# Patient Record
Sex: FEMALE | Race: OTHER | Employment: OTHER | URBAN - METROPOLITAN AREA
[De-identification: names, ages, dates, MRNs, and addresses within clinical notes are randomized per-mention and may not be internally consistent; named-entity substitution may affect disease eponyms.]

---

## 2021-11-04 ENCOUNTER — HOSPITAL ENCOUNTER (OUTPATIENT)
Dept: PREADMISSION TESTING | Age: 86
Discharge: HOME | End: 2021-11-04

## 2021-11-04 VITALS — HEIGHT: 66 IN | BODY MASS INDEX: 17.68 KG/M2 | WEIGHT: 110 LBS

## 2021-11-04 RX ORDER — ASPIRIN 81 MG/1
81 TABLET ORAL DAILY
COMMUNITY

## 2021-11-04 RX ORDER — PAROXETINE 10 MG/1
10 TABLET, FILM COATED ORAL DAILY
COMMUNITY

## 2021-11-04 RX ORDER — SODIUM CHLORIDE, SODIUM LACTATE, POTASSIUM CHLORIDE, CALCIUM CHLORIDE 600; 310; 30; 20 MG/100ML; MG/100ML; MG/100ML; MG/100ML
75 INJECTION, SOLUTION INTRAVENOUS CONTINUOUS
Status: CANCELLED | OUTPATIENT
Start: 2021-11-04

## 2021-11-04 RX ORDER — TROPICAMIDE 10 MG/ML
1 SOLUTION/ DROPS OPHTHALMIC
Status: CANCELLED | OUTPATIENT
Start: 2021-11-04 | End: 2021-11-04

## 2021-11-04 RX ORDER — BISMUTH SUBSALICYLATE 262 MG
1 TABLET,CHEWABLE ORAL DAILY
COMMUNITY
End: 2021-11-04

## 2021-11-04 RX ORDER — FAMOTIDINE 20 MG/1
20 TABLET, FILM COATED ORAL DAILY
COMMUNITY

## 2021-11-04 RX ORDER — PHENYLEPHRINE HYDROCHLORIDE 25 MG/ML
1 SOLUTION/ DROPS OPHTHALMIC
Status: CANCELLED | OUTPATIENT
Start: 2021-11-04 | End: 2021-11-04

## 2021-11-04 NOTE — PERIOP NOTES
PAT - SURGICAL PRE-ADMISSION INSTRUCTIONS    NAME:  Meghan Vieyra                                                          TODAY'S DATE:  11/4/2021    SURGERY DATE:  11/17/2021                                  SURGERY ARRIVAL TIME:   TBA    1. Do NOT eat or drink anything, including candy or gum, after MIDNIGHT on 11-17 , unless you have specific instructions from your Surgeon or Anesthesia Provider to do so. 2. No smoking 24 hours before surgery. 3. No alcohol 24 hours prior to the day of surgery. 4. No recreational drugs for one week prior to the day of surgery. 5. Leave all valuables, including money/purse, at home. 6. Remove all jewelry, nail polish, makeup (including mascara); no lotions, powders, deodorant, or perfume/cologne/after shave. 7. Glasses/Contact lenses and Dentures may be worn to the hospital.  They will be removed prior to surgery. 8. Call your doctor if symptoms of a cold or illness develop within 24 ours prior to surgery. 9. AN ADULT MUST DRIVE YOU HOME AFTER OUTPATIENT SURGERY. 10. If you are having an OUTPATIENT procedure, please make arrangements for a responsible adult to be with you for 24 hours after your surgery. 11. If you are admitted to the hospital, you will be assigned to a bed after surgery is complete. Normally a family member will not be able to see you until you are in your assigned bed. 12. Visitation Restrictions Explained. Special Instructions:  Covid Test 11-12, Quarantine requirements discussed  NONE. Patient Prep:    N/A          These surgical instructions were reviewed with the patient during the PAT phone call     Pt.s  is POA.  He will assist her on [facility]

## 2021-11-12 ENCOUNTER — HOSPITAL ENCOUNTER (OUTPATIENT)
Dept: PREADMISSION TESTING | Age: 86
Discharge: HOME | End: 2021-11-12
Payer: MEDICARE

## 2021-11-12 PROCEDURE — U0003 INFECTIOUS AGENT DETECTION BY NUCLEIC ACID (DNA OR RNA); SEVERE ACUTE RESPIRATORY SYNDROME CORONAVIRUS 2 (SARS-COV-2) (CORONAVIRUS DISEASE [COVID-19]), AMPLIFIED PROBE TECHNIQUE, MAKING USE OF HIGH THROUGHPUT TECHNOLOGIES AS DESCRIBED BY CMS-2020-01-R: HCPCS

## 2021-11-13 LAB — SARS-COV-2, COV2NT: NOT DETECTED

## 2021-11-16 ENCOUNTER — ANESTHESIA EVENT (OUTPATIENT)
Dept: SURGERY | Age: 86
End: 2021-11-16
Payer: MEDICARE

## 2021-11-16 RX ORDER — DEXTROSE 50 % IN WATER (D50W) INTRAVENOUS SYRINGE
25-50 AS NEEDED
Status: CANCELLED | OUTPATIENT
Start: 2021-11-16

## 2021-11-16 RX ORDER — SODIUM CHLORIDE, SODIUM LACTATE, POTASSIUM CHLORIDE, CALCIUM CHLORIDE 600; 310; 30; 20 MG/100ML; MG/100ML; MG/100ML; MG/100ML
1000 INJECTION, SOLUTION INTRAVENOUS CONTINUOUS
Status: CANCELLED | OUTPATIENT
Start: 2021-11-16

## 2021-11-16 RX ORDER — SODIUM CHLORIDE 0.9 % (FLUSH) 0.9 %
5-40 SYRINGE (ML) INJECTION EVERY 8 HOURS
Status: CANCELLED | OUTPATIENT
Start: 2021-11-16

## 2021-11-16 RX ORDER — MAGNESIUM SULFATE 100 %
4 CRYSTALS MISCELLANEOUS AS NEEDED
Status: CANCELLED | OUTPATIENT
Start: 2021-11-16

## 2021-11-16 RX ORDER — FLUMAZENIL 0.1 MG/ML
0.2 INJECTION INTRAVENOUS
Status: CANCELLED | OUTPATIENT
Start: 2021-11-16

## 2021-11-16 RX ORDER — NALOXONE HYDROCHLORIDE 0.4 MG/ML
0.1 INJECTION, SOLUTION INTRAMUSCULAR; INTRAVENOUS; SUBCUTANEOUS AS NEEDED
Status: CANCELLED | OUTPATIENT
Start: 2021-11-16

## 2021-11-16 RX ORDER — SODIUM CHLORIDE 0.9 % (FLUSH) 0.9 %
5-40 SYRINGE (ML) INJECTION AS NEEDED
Status: CANCELLED | OUTPATIENT
Start: 2021-11-16

## 2021-11-17 ENCOUNTER — ANESTHESIA (OUTPATIENT)
Dept: SURGERY | Age: 86
End: 2021-11-17
Payer: MEDICARE

## 2021-11-17 ENCOUNTER — HOSPITAL ENCOUNTER (OUTPATIENT)
Age: 86
Setting detail: OUTPATIENT SURGERY
Discharge: HOME | End: 2021-11-17
Attending: OPHTHALMOLOGY | Admitting: OPHTHALMOLOGY
Payer: MEDICARE

## 2021-11-17 VITALS
OXYGEN SATURATION: 100 % | TEMPERATURE: 97.7 F | BODY MASS INDEX: 16.07 KG/M2 | SYSTOLIC BLOOD PRESSURE: 122 MMHG | HEART RATE: 92 BPM | WEIGHT: 100 LBS | HEIGHT: 66 IN | RESPIRATION RATE: 16 BRPM | DIASTOLIC BLOOD PRESSURE: 59 MMHG

## 2021-11-17 PROBLEM — H26.9 CATARACT: Status: ACTIVE | Noted: 2021-11-17

## 2021-11-17 PROCEDURE — 74011250636 HC RX REV CODE- 250/636: Performed by: OPHTHALMOLOGY

## 2021-11-17 PROCEDURE — 74011250636 HC RX REV CODE- 250/636: Performed by: ANESTHESIOLOGY

## 2021-11-17 PROCEDURE — 2709999900 HC NON-CHARGEABLE SUPPLY: Performed by: OPHTHALMOLOGY

## 2021-11-17 PROCEDURE — 77030013851 HC PK PHACO KT ALCN -B: Performed by: OPHTHALMOLOGY

## 2021-11-17 PROCEDURE — 77030002949 HC SUT NYL ALCN -B: Performed by: OPHTHALMOLOGY

## 2021-11-17 PROCEDURE — 77030040361 HC SLV COMPR DVT MDII -B: Performed by: OPHTHALMOLOGY

## 2021-11-17 PROCEDURE — 77030006685 HC BLD OPHTH ALCN -B: Performed by: OPHTHALMOLOGY

## 2021-11-17 PROCEDURE — 77030018837 HC SOL IRR OPTH ALCN -A: Performed by: OPHTHALMOLOGY

## 2021-11-17 PROCEDURE — 76210000021 HC REC RM PH II 0.5 TO 1 HR: Performed by: OPHTHALMOLOGY

## 2021-11-17 PROCEDURE — 77030020272 HC MISC IMPL LENS: Performed by: OPHTHALMOLOGY

## 2021-11-17 PROCEDURE — 74011000250 HC RX REV CODE- 250: Performed by: OPHTHALMOLOGY

## 2021-11-17 PROCEDURE — 76060000033 HC ANESTHESIA 1 TO 1.5 HR: Performed by: OPHTHALMOLOGY

## 2021-11-17 PROCEDURE — 76010000149 HC OR TIME 1 TO 1.5 HR: Performed by: OPHTHALMOLOGY

## 2021-11-17 PROCEDURE — 77030020782 HC GWN BAIR PAWS FLX 3M -B: Performed by: OPHTHALMOLOGY

## 2021-11-17 PROCEDURE — V2632 POST CHMBR INTRAOCULAR LENS: HCPCS | Performed by: OPHTHALMOLOGY

## 2021-11-17 PROCEDURE — 77030013389: Performed by: OPHTHALMOLOGY

## 2021-11-17 PROCEDURE — 77030031761 HC CYSTOTOM OPHTH IRR SYS BVTC -A: Performed by: OPHTHALMOLOGY

## 2021-11-17 PROCEDURE — 77030034627 HC PRB VITRCMY ANT CENTUR ALCN -G1: Performed by: OPHTHALMOLOGY

## 2021-11-17 DEVICE — LENS INTRAOCULAR BCNVX 6X13 MM ANTR CHMBR MLPC CYL PWR SIL: Type: IMPLANTABLE DEVICE | Site: EYE | Status: FUNCTIONAL

## 2021-11-17 RX ORDER — LIDOCAINE HYDROCHLORIDE 10 MG/ML
INJECTION, SOLUTION EPIDURAL; INFILTRATION; INTRACAUDAL; PERINEURAL AS NEEDED
Status: DISCONTINUED | OUTPATIENT
Start: 2021-11-17 | End: 2021-11-17 | Stop reason: HOSPADM

## 2021-11-17 RX ORDER — EPINEPHRINE 1 MG/ML
INJECTION, SOLUTION, CONCENTRATE INTRAVENOUS AS NEEDED
Status: DISCONTINUED | OUTPATIENT
Start: 2021-11-17 | End: 2021-11-17 | Stop reason: HOSPADM

## 2021-11-17 RX ORDER — TROPICAMIDE 10 MG/ML
1 SOLUTION/ DROPS OPHTHALMIC
Status: COMPLETED | OUTPATIENT
Start: 2021-11-17 | End: 2021-11-17

## 2021-11-17 RX ORDER — PHENYLEPHRINE HYDROCHLORIDE 25 MG/ML
1 SOLUTION/ DROPS OPHTHALMIC
Status: COMPLETED | OUTPATIENT
Start: 2021-11-17 | End: 2021-11-17

## 2021-11-17 RX ORDER — SODIUM CHLORIDE, SODIUM LACTATE, POTASSIUM CHLORIDE, CALCIUM CHLORIDE 600; 310; 30; 20 MG/100ML; MG/100ML; MG/100ML; MG/100ML
75 INJECTION, SOLUTION INTRAVENOUS CONTINUOUS
Status: DISCONTINUED | OUTPATIENT
Start: 2021-11-17 | End: 2021-11-17 | Stop reason: HOSPADM

## 2021-11-17 RX ORDER — FENTANYL CITRATE 50 UG/ML
INJECTION, SOLUTION INTRAMUSCULAR; INTRAVENOUS AS NEEDED
Status: DISCONTINUED | OUTPATIENT
Start: 2021-11-17 | End: 2021-11-17 | Stop reason: HOSPADM

## 2021-11-17 RX ADMIN — SODIUM CHLORIDE, POTASSIUM CHLORIDE, SODIUM LACTATE AND CALCIUM CHLORIDE 75 ML/HR: 600; 310; 30; 20 INJECTION, SOLUTION INTRAVENOUS at 11:05

## 2021-11-17 RX ADMIN — LIDOCAINE HYDROCHLORIDE 2 DROP: 35 GEL OPHTHALMIC at 11:18

## 2021-11-17 RX ADMIN — PHENYLEPHRINE HYDROCHLORIDE 1 DROP: 2.5 SOLUTION/ DROPS OPHTHALMIC at 11:06

## 2021-11-17 RX ADMIN — FENTANYL CITRATE 25 MCG: 50 INJECTION, SOLUTION INTRAMUSCULAR; INTRAVENOUS at 12:29

## 2021-11-17 RX ADMIN — TROPICAMIDE 1 DROP: 10 SOLUTION/ DROPS OPHTHALMIC at 11:00

## 2021-11-17 RX ADMIN — PHENYLEPHRINE HYDROCHLORIDE 1 DROP: 2.5 SOLUTION/ DROPS OPHTHALMIC at 11:10

## 2021-11-17 RX ADMIN — TROPICAMIDE 1 DROP: 10 SOLUTION/ DROPS OPHTHALMIC at 11:10

## 2021-11-17 RX ADMIN — PHENYLEPHRINE HYDROCHLORIDE 1 DROP: 2.5 SOLUTION/ DROPS OPHTHALMIC at 11:00

## 2021-11-17 RX ADMIN — TROPICAMIDE 1 DROP: 10 SOLUTION/ DROPS OPHTHALMIC at 11:06

## 2021-11-17 RX ADMIN — PHENYLEPHRINE HYDROCHLORIDE 1 DROP: 2.5 SOLUTION/ DROPS OPHTHALMIC at 11:15

## 2021-11-17 RX ADMIN — FENTANYL CITRATE 25 MCG: 50 INJECTION, SOLUTION INTRAMUSCULAR; INTRAVENOUS at 12:20

## 2021-11-17 RX ADMIN — TROPICAMIDE 1 DROP: 10 SOLUTION/ DROPS OPHTHALMIC at 11:15

## 2021-11-17 RX ADMIN — FENTANYL CITRATE 25 MCG: 50 INJECTION, SOLUTION INTRAMUSCULAR; INTRAVENOUS at 12:24

## 2021-11-17 NOTE — INTERVAL H&P NOTE
Update History & Physical    The Patient's History and Physical of November 17, 2021 was reviewed with the patient and I examined the patient. There was no change. The surgical site was confirmed by the patient and me. Plan:  The risk, benefits, expected outcome, and alternative to the recommended procedure have been discussed with the patient. Patient understands and wants to proceed with the procedure.     Electronically signed by Collette Rowe MD on 11/17/2021 at 10:27 AM

## 2021-11-17 NOTE — DISCHARGE INSTRUCTIONS
Post-Operative Cataract Instructions  [facility] INVASIVE SURGERY [facility]  Wisam Ramirez M.D. Lamonte Alcaraz. [address]se, [facility]  [phone]      Diet  1. Resume normal diet. 2. Do not drink alcoholic beverages, including beer for 24 hours. Activity  1. Do not drive a car or operate any hazardous machinery the day of surgery. 2. You may resume normal activities as tolerated. 3. No bending or heavy lifting. 4. No reading or computer work after your surgery. You may watch TV. Wound Care  1. Anticipate that your eye will tear and water. 2. You may also experience a sensation of a foreign body, sand, or grit in the surgical eye, this is normal.  3. Do not touch the affected eye. 4. ** Do not remove eye shield unless directed to do so by your physician. **  5. Wear eye shield when resting or sleeping for one week. Medications  1. Take Tylenol Extra Strength two (2) tablets by mouth upon arrival home and then every four (4) hours as needed for discomfort. 2. Regarding Eye drops:  -  Begin using your eye drops as directed by your physician in the (left) operative eye. One drop of     []   Zymar    [x]  Vigamox   along with one (1) drop     []  Acular    [x]  Voltaren   every four (4) hours while awake. Wait five (5) minutes then apply one (1) drop     []  Pred Forte    [x]  Econopred Plus   every four (4) hours while awake. 3. If you take glaucoma medications, continue to do so unless the physician otherwise instructs you. 4. You may use artificial tears as needed if your eye feels scratchy. Notify your Physician Immediately for any of the followin. Excessive pain not relieved by Tylenol especially headache or increasing pressure to the operative eye. 2. Persistent nausea lasting more than eight hours. 3. If any vomiting occurs. If any questions or concerns arise, call your Surgeon at [phone].     Keep scheduled appointment with Dr. Maurizio Steinberg for tomorrow - Thursday, 11/18/21    Continue Using Eye Drops as instructed per Dr. Sohail Dowell office    Use eye shield at all times     DISCHARGE SUMMARY from Nurse    PATIENT INSTRUCTIONS:    After general anesthesia or intravenous sedation, for 24 hours or while taking prescription Narcotics:  · Limit your activities  · Do not drive and operate hazardous machinery  · Do not make important personal or business decisions  · Do  not drink alcoholic beverages  · If you have not urinated within 8 hours after discharge, please contact your surgeon on call. Report the following to your surgeon:  · Excessive pain, swelling, redness or odor of or around the surgical area  · Temperature over 100.5  · Nausea and vomiting lasting longer than 4 hours or if unable to take medications  · Any signs of decreased circulation or nerve impairment to extremity: change in color, persistent  numbness, tingling, coldness or increase pain  · Any questions    What to do at Home:  Recommended activity: Ambulate in house    If you experience any of the following symptoms above, please follow up with Dr. Maurizio Steinberg. *  Please give a list of your current medications to your Primary Care Provider. *  Please update this list whenever your medications are discontinued, doses are      changed, or new medications (including over-the-counter products) are added. *  Please carry medication information at all times in case of emergency situations. These are general instructions for a healthy lifestyle:    No smoking/ No tobacco products/ Avoid exposure to second hand smoke  Surgeon General's Warning:  Quitting smoking now greatly reduces serious risk to your health.     Obesity, smoking, and sedentary lifestyle greatly increases your risk for illness    A healthy diet, regular physical exercise & weight monitoring are important for maintaining a healthy lifestyle    You may be retaining fluid if you have a history of heart failure or if you experience any of the following symptoms:  Weight gain of 3 pounds or more overnight or 5 pounds in a week, increased swelling in our hands or feet or shortness of breath while lying flat in bed. Please call your doctor as soon as you notice any of these symptoms; do not wait until your next office visit. Patient armband removed and shredded    The discharge information has been reviewed with the patient and caregiver. The patient and caregiver verbalized understanding. Discharge medications reviewed with the patient and caregiver and appropriate educational materials and side effects teaching were provided. Learning About COVID-19 and Social Distancing  What is it? Social distancing means putting space between yourself and other people. The recommended distance is 6 feet, or about 2 meters. This also means staying away from any place where people may gather, such as perez or other public gathering places. Why is it important? Social distancing is the best way to reduce the spread of COVID-19. This virus seems to spread from person to person through droplets from coughing and sneezing. So if you keep your distance from others, you're less likely to get it or spread it. And social distancing is important for everyone, not just those who are at high risk of infection, like older people. You might have the virus but not have symptoms. You could then give the infection to someone you come into contact with. How is it done? Putting 6 feet, or about 2 meters, between you and other people is the recommended distance. Also stay away from any place where people may gather, such as pe[place] or other public gathering places. So if possible:  · Work from home, and keep your kids at home. · Don't travel if you don't have to. And avoid public transportation, ride-shares, and taxis unless you have no choice.   · Limit shopping to essentials, like food and medicines. · Wear a cloth face cover if you have to go to a public place like the grocery store or pharmacy. · Don't eat in restaurants. (You can still get takeout or food deliveries.)  · Avoid crowds and busy places. Follow stay-at-home orders or other directions for your area. Where can you learn more? Go to http://www.gray.com/  Enter A133 in the search box to learn more about \"Learning About COVID-19 and Social Distancing. \"  Current as of: December 18, 2020               Content Version: 12.8  © 2378-4632 [employer]. Care instructions adapted under license by [employer] (which disclaims liability or warranty for this information). If you have questions about a medical condition or this instruction, always ask your healthcare professional. [address] any warranty or liability for your use of this information.     ___________________________________________________________________________________________________________________________________

## 2021-11-17 NOTE — PERIOP NOTES
Arrived to Phase 2 - no c/o offered - Dr. Harrison Gallardo at bedside - discussing procedure and follow up with pts

## 2021-11-17 NOTE — PERIOP NOTES
Reviewed PTA medication list with patient/caregiver and patient/caregiver denies any additional medications. Patient admits to having a responsible adult care for them at home for at least 24 hours after surgery. Patient encouraged to use gown warming system and informed that using said warming gown to regulate body temperature prior to a procedure has been shown to help reduce the risks of blood clots and infection. Patient's pharmacy of choice verified and documented in PTA medication section. Dual skin assessment & fall risk band verification completed with Ricki Blandon RN.

## 2021-11-17 NOTE — OP NOTES
Cataract Operative Note      Patient: Michelle Stone               Sex: female          DOA: 11/17/2021         YOB: 1927      Age:  80 y.o.        LOS:  LOS: 0 days     Preoperative Diagnosis: Cataract left eye    Postoperative Diagnosis:  Cataract  left eye  Surgeon: Flory Dominguez MD, M.D. Anesthesia:  Topical anesthesia  Procedure:  Phacoemulsification of posterior chamber for intraocular lens implantation left    Fluids:  0    Procedure in Detail: The operative eye was prepped and draped in the usual fashion. A lid speculum was placed in the operative eye. A clear cornea approach was utilized. A paracentesis incision(s) was constructed with a 1 mm slit knife. One percent preservative-free lidocaine followed by viscoelastic was instilled into the anterior chamber. A clear corneal incision was made with a slit knife. A continuous curvilinear capsulorrhexis was constructed followed by hydrodissection. A phacoemulsification tip was placed into the eye, and the lens nucleus was emulsified. [place] through lens removal a PC tear was noted and approximately 1/2 the lens fell posteriorly. An anterior vitrectomy was performed and cortical cleanup was completed. The intraocular lens was then placed into the sulcus after it was re-inflated with viscoelastic. The remaining viscoelastic was then removed using the irrigation/aspiration device. Miochol was instilled in the anterior chamber. One 10-nylon suture was placed. At the end of the procedure the wound was found to be watertight, the anterior chamber was deep and the pupil round. No blood loss during surgery. An antibiotic and anti-inflammatroy was placed into the operative eye. The lid speculum was removed. Protective sunglasses were then placed onto the patient. The patient was taken to the [address] [place] [address] Unit (PACU) in good condition having tolerated the procedure well.     Estimated Blood Loss: 0 Implants:   Implant Name Type Inv.  Item Serial No.  Lot No. LRB No. Used Action   LENS INTRAOCULAR BCNVX 6X13 MM ANTR CHMBR MLPC CYL [facility] - [id number]  LENS INTRAOCULAR BCNVX 6X13 MM ANTR CHMBR MLPC CYL [facility] [id number]  Left 1 Implanted       Specimens: * No specimens in log *            Complications:  As above           Lei Alba MD , MDAVID.  [unfilled]  1:18 PM

## 2021-11-17 NOTE — PERIOP NOTES
Pt was accompanied by her , Gene, and a caregiver.  has POA and signed surgical consent at office. He is with pt in holding and has been good historian for patient information.

## 2021-11-17 NOTE — ANESTHESIA POSTPROCEDURE EVALUATION
Procedure(s):  CATARACT EXTRACTION WITH INTRA OCULAR LENS IMPLANT- LEFT EYE. MAC    Anesthesia Post Evaluation        Comments: Post-Anesthesia Evaluation and Assessment    Cardiovascular Function/Vital Signs  /66   Pulse 98   Temp 36.8 °C (98.3 °F)   Resp 16   Ht 5' 5.5\" (1.664 m)   Wt 45.4 kg (100 lb)   SpO2 96%   BMI 16.39 kg/m²     Patient is status post Procedure(s):  CATARACT EXTRACTION WITH INTRA OCULAR LENS IMPLANT- LEFT EYE. Nausea/Vomiting: Controlled. Postoperative hydration reviewed and adequate. Pain:  Pain Scale 1: FLACC (11/17/21 1325)  Pain Intensity 1: 0 (11/17/21 1325)   Managed. Neurological Status:   Neuro (WDL): Within Defined Limits (11/17/21 1107)   At baseline. Mental Status and Level of Consciousness: Arousable. Pulmonary Status:   O2 Device: None (Room air) (11/17/21 1325)   Adequate oxygenation and airway patent. Complications related to anesthesia: None    Post-anesthesia assessment completed. No concerns. Patient has met all discharge requirements. Signed By: Cruz Richard MD    November 17, 2021                   INITIAL Post-op Vital signs:   Vitals Value Taken Time   /62 11/17/21 1345   Temp 36.8 °C (98.3 °F) 11/17/21 1325   Pulse 91 11/17/21 1356   Resp 16 11/17/21 1325   SpO2 97 % 11/17/21 1356   Vitals shown include unvalidated device data.

## 2021-11-17 NOTE — ANESTHESIA PREPROCEDURE EVALUATION
Relevant Problems   CARDIOVASCULAR   (+) HTN (hypertension), malignant   (+) Renal artery stenosis (HCC)      RENAL FAILURE   (+) Chronic kidney disease      HEMATOLOGY   (+) Pernicious anemia      PERSONAL HX & FAMILY HX OF CANCER   (+) Breast cancer (HCC)       Anesthetic History   No history of anesthetic complications            Review of Systems / Medical History  Patient summary reviewed, nursing notes reviewed and pertinent labs reviewed    Pulmonary  Within defined limits                 Neuro/Psych   Within defined limits    CVA  TIA    Comments: Memory loss Cardiovascular    Hypertension          Hyperlipidemia    Exercise tolerance: >4 METS     GI/Hepatic/Renal     GERD    Renal disease: CRI       Endo/Other        Arthritis     Other Findings              Physical Exam    Airway  Mallampati: III  TM Distance: 4 - 6 cm  Neck ROM: normal range of motion   Mouth opening: Normal     Cardiovascular               Dental  No notable dental hx       Pulmonary                 Abdominal  GI exam deferred       Other Findings            Anesthetic Plan    ASA: 3  Anesthesia type: MAC          Induction: Intravenous  Anesthetic plan and risks discussed with: Patient, Legal guardian and Spouse      Patient did not converse very much. I spoke to her . Will avoid versed and only lightly sedate.

## 2022-07-14 ENCOUNTER — APPOINTMENT (OUTPATIENT)
Dept: CT IMAGING | Age: 87
DRG: 690 | End: 2022-07-14
Attending: PHYSICIAN ASSISTANT
Payer: MEDICARE

## 2022-07-14 ENCOUNTER — HOSPITAL ENCOUNTER (INPATIENT)
Age: 87
LOS: 4 days | Discharge: HOME HEALTH | DRG: 690 | End: 2022-07-18
Attending: STUDENT IN AN ORGANIZED HEALTH CARE EDUCATION/TRAINING PROGRAM | Admitting: FAMILY MEDICINE
Payer: MEDICARE

## 2022-07-14 ENCOUNTER — APPOINTMENT (OUTPATIENT)
Dept: GENERAL RADIOLOGY | Age: 87
DRG: 690 | End: 2022-07-14
Attending: STUDENT IN AN ORGANIZED HEALTH CARE EDUCATION/TRAINING PROGRAM
Payer: MEDICARE

## 2022-07-14 DIAGNOSIS — R41.82 ALTERED MENTAL STATUS, UNSPECIFIED ALTERED MENTAL STATUS TYPE: Primary | ICD-10-CM

## 2022-07-14 DIAGNOSIS — K57.32 DIVERTICULITIS OF LARGE INTESTINE WITHOUT PERFORATION OR ABSCESS WITHOUT BLEEDING: ICD-10-CM

## 2022-07-14 DIAGNOSIS — R31.9 URINARY TRACT INFECTION WITH HEMATURIA, SITE UNSPECIFIED: ICD-10-CM

## 2022-07-14 DIAGNOSIS — N39.0 URINARY TRACT INFECTION WITH HEMATURIA, SITE UNSPECIFIED: ICD-10-CM

## 2022-07-14 PROBLEM — K57.92 DIVERTICULITIS: Status: ACTIVE | Noted: 2022-07-14

## 2022-07-14 LAB
ALBUMIN SERPL-MCNC: 3.1 G/DL (ref 3.4–5)
ALBUMIN/GLOB SERPL: 0.9 {RATIO} (ref 0.8–1.7)
ALP SERPL-CCNC: 88 U/L (ref 45–117)
ALT SERPL-CCNC: 17 U/L (ref 13–56)
ANION GAP SERPL CALC-SCNC: 4 MMOL/L (ref 3–18)
APPEARANCE UR: ABNORMAL
AST SERPL-CCNC: 20 U/L (ref 10–38)
BACTERIA URNS QL MICRO: ABNORMAL /HPF
BASOPHILS # BLD: 0.1 K/UL (ref 0–0.1)
BASOPHILS NFR BLD: 1 % (ref 0–2)
BILIRUB DIRECT SERPL-MCNC: 0.1 MG/DL (ref 0–0.2)
BILIRUB SERPL-MCNC: 0.3 MG/DL (ref 0.2–1)
BILIRUB UR QL: NEGATIVE
BUN SERPL-MCNC: 22 MG/DL (ref 7–18)
BUN/CREAT SERPL: 19 (ref 12–20)
CALCIUM SERPL-MCNC: 8.7 MG/DL (ref 8.5–10.1)
CHLORIDE SERPL-SCNC: 106 MMOL/L (ref 100–111)
CO2 SERPL-SCNC: 29 MMOL/L (ref 21–32)
COLOR UR: YELLOW
CREAT SERPL-MCNC: 1.17 MG/DL (ref 0.6–1.3)
DIFFERENTIAL METHOD BLD: ABNORMAL
EOSINOPHIL # BLD: 0.1 K/UL (ref 0–0.4)
EOSINOPHIL NFR BLD: 1 % (ref 0–5)
EPITH CASTS URNS QL MICRO: ABNORMAL /LPF (ref 0–5)
ERYTHROCYTE [DISTWIDTH] IN BLOOD BY AUTOMATED COUNT: 13.3 % (ref 11.6–14.5)
GLOBULIN SER CALC-MCNC: 3.5 G/DL (ref 2–4)
GLUCOSE SERPL-MCNC: 113 MG/DL (ref 74–99)
GLUCOSE UR STRIP.AUTO-MCNC: NEGATIVE MG/DL
HCT VFR BLD AUTO: 31.1 % (ref 35–45)
HGB BLD-MCNC: 9.8 G/DL (ref 12–16)
HGB UR QL STRIP: ABNORMAL
IMM GRANULOCYTES # BLD AUTO: 0 K/UL (ref 0–0.04)
IMM GRANULOCYTES NFR BLD AUTO: 0 % (ref 0–0.5)
KETONES UR QL STRIP.AUTO: NEGATIVE MG/DL
LACTATE SERPL-SCNC: 1.2 MMOL/L (ref 0.4–2)
LEUKOCYTE ESTERASE UR QL STRIP.AUTO: ABNORMAL
LIPASE SERPL-CCNC: 105 U/L (ref 73–393)
LYMPHOCYTES # BLD: 1.1 K/UL (ref 0.9–3.6)
LYMPHOCYTES NFR BLD: 13 % (ref 21–52)
MCH RBC QN AUTO: 28.7 PG (ref 24–34)
MCHC RBC AUTO-ENTMCNC: 31.5 G/DL (ref 31–37)
MCV RBC AUTO: 91.2 FL (ref 78–100)
MONOCYTES # BLD: 0.5 K/UL (ref 0.05–1.2)
MONOCYTES NFR BLD: 6 % (ref 3–10)
NEUTS SEG # BLD: 6.5 K/UL (ref 1.8–8)
NEUTS SEG NFR BLD: 78 % (ref 40–73)
NITRITE UR QL STRIP.AUTO: POSITIVE
NRBC # BLD: 0 K/UL (ref 0–0.01)
NRBC BLD-RTO: 0 PER 100 WBC
PH UR STRIP: 7.5 [PH] (ref 5–8)
PLATELET # BLD AUTO: 222 K/UL (ref 135–420)
PMV BLD AUTO: 11.5 FL (ref 9.2–11.8)
POTASSIUM SERPL-SCNC: 4.4 MMOL/L (ref 3.5–5.5)
PROT SERPL-MCNC: 6.6 G/DL (ref 6.4–8.2)
PROT UR STRIP-MCNC: ABNORMAL MG/DL
RBC # BLD AUTO: 3.41 M/UL (ref 4.2–5.3)
RBC #/AREA URNS HPF: ABNORMAL /HPF (ref 0–5)
SODIUM SERPL-SCNC: 139 MMOL/L (ref 136–145)
SP GR UR REFRACTOMETRY: 1.01 (ref 1–1.03)
UROBILINOGEN UR QL STRIP.AUTO: 0.2 EU/DL (ref 0.2–1)
WBC # BLD AUTO: 8.3 K/UL (ref 4.6–13.2)
WBC URNS QL MICRO: ABNORMAL /HPF (ref 0–5)

## 2022-07-14 PROCEDURE — 70450 CT HEAD/BRAIN W/O DYE: CPT

## 2022-07-14 PROCEDURE — 74011000258 HC RX REV CODE- 258: Performed by: PHYSICIAN ASSISTANT

## 2022-07-14 PROCEDURE — 81001 URINALYSIS AUTO W/SCOPE: CPT

## 2022-07-14 PROCEDURE — 93005 ELECTROCARDIOGRAM TRACING: CPT

## 2022-07-14 PROCEDURE — 65270000029 HC RM PRIVATE

## 2022-07-14 PROCEDURE — 96367 TX/PROPH/DG ADDL SEQ IV INF: CPT

## 2022-07-14 PROCEDURE — 99285 EMERGENCY DEPT VISIT HI MDM: CPT

## 2022-07-14 PROCEDURE — 80048 BASIC METABOLIC PNL TOTAL CA: CPT

## 2022-07-14 PROCEDURE — 83605 ASSAY OF LACTIC ACID: CPT

## 2022-07-14 PROCEDURE — 87086 URINE CULTURE/COLONY COUNT: CPT

## 2022-07-14 PROCEDURE — 87186 SC STD MICRODIL/AGAR DIL: CPT

## 2022-07-14 PROCEDURE — 85025 COMPLETE CBC W/AUTO DIFF WBC: CPT

## 2022-07-14 PROCEDURE — 83690 ASSAY OF LIPASE: CPT

## 2022-07-14 PROCEDURE — 80076 HEPATIC FUNCTION PANEL: CPT

## 2022-07-14 PROCEDURE — 71045 X-RAY EXAM CHEST 1 VIEW: CPT

## 2022-07-14 PROCEDURE — 74176 CT ABD & PELVIS W/O CONTRAST: CPT

## 2022-07-14 PROCEDURE — 96365 THER/PROPH/DIAG IV INF INIT: CPT

## 2022-07-14 PROCEDURE — 87077 CULTURE AEROBIC IDENTIFY: CPT

## 2022-07-14 PROCEDURE — 74018 RADEX ABDOMEN 1 VIEW: CPT

## 2022-07-14 PROCEDURE — 74011250636 HC RX REV CODE- 250/636: Performed by: PHYSICIAN ASSISTANT

## 2022-07-14 RX ORDER — SODIUM CHLORIDE 0.9 % (FLUSH) 0.9 %
5-40 SYRINGE (ML) INJECTION AS NEEDED
Status: DISCONTINUED | OUTPATIENT
Start: 2022-07-14 | End: 2022-07-18 | Stop reason: HOSPADM

## 2022-07-14 RX ORDER — ONDANSETRON 2 MG/ML
4 INJECTION INTRAMUSCULAR; INTRAVENOUS
Status: DISCONTINUED | OUTPATIENT
Start: 2022-07-14 | End: 2022-07-18 | Stop reason: HOSPADM

## 2022-07-14 RX ORDER — ENOXAPARIN SODIUM 100 MG/ML
40 INJECTION SUBCUTANEOUS DAILY
Status: DISCONTINUED | OUTPATIENT
Start: 2022-07-15 | End: 2022-07-15

## 2022-07-14 RX ORDER — ACETAMINOPHEN 650 MG/1
650 SUPPOSITORY RECTAL
Status: DISCONTINUED | OUTPATIENT
Start: 2022-07-14 | End: 2022-07-18 | Stop reason: HOSPADM

## 2022-07-14 RX ORDER — SODIUM CHLORIDE 9 MG/ML
100 INJECTION, SOLUTION INTRAVENOUS CONTINUOUS
Status: DISCONTINUED | OUTPATIENT
Start: 2022-07-15 | End: 2022-07-18

## 2022-07-14 RX ORDER — POLYETHYLENE GLYCOL 3350 17 G/17G
17 POWDER, FOR SOLUTION ORAL DAILY PRN
Status: DISCONTINUED | OUTPATIENT
Start: 2022-07-14 | End: 2022-07-18 | Stop reason: HOSPADM

## 2022-07-14 RX ORDER — ONDANSETRON 4 MG/1
4 TABLET, ORALLY DISINTEGRATING ORAL
Status: DISCONTINUED | OUTPATIENT
Start: 2022-07-14 | End: 2022-07-18 | Stop reason: HOSPADM

## 2022-07-14 RX ORDER — ACETAMINOPHEN 325 MG/1
650 TABLET ORAL
Status: DISCONTINUED | OUTPATIENT
Start: 2022-07-14 | End: 2022-07-18 | Stop reason: HOSPADM

## 2022-07-14 RX ORDER — SODIUM CHLORIDE 0.9 % (FLUSH) 0.9 %
5-40 SYRINGE (ML) INJECTION EVERY 8 HOURS
Status: DISCONTINUED | OUTPATIENT
Start: 2022-07-15 | End: 2022-07-18 | Stop reason: HOSPADM

## 2022-07-14 RX ADMIN — CEFTRIAXONE 1 G: 1 INJECTION, POWDER, FOR SOLUTION INTRAMUSCULAR; INTRAVENOUS at 19:49

## 2022-07-14 RX ADMIN — PIPERACILLIN AND TAZOBACTAM 3.38 G: 3; .375 INJECTION, POWDER, LYOPHILIZED, FOR SOLUTION INTRAVENOUS at 21:48

## 2022-07-14 NOTE — ED PROVIDER NOTES
EMERGENCY DEPARTMENT HISTORY AND PHYSICAL EXAM    Date: 7/14/2022  Patient Name: Mirela Hannah    History of Presenting Illness     Chief Complaint   Patient presents with    Abdominal Pain         History Provided By: Patient's  and Caregiver    Chief Complaint: abd pain, AMS      Additional History (Context):   4:57 PM  Mirela Hannah is a 80 y.o. female with PMHX hemorrhagic stroke  presents to the emergency department C/O generalized weakness and right lower abdominal pain left sided back pain that began yesterday. No nausea vomiting or diarrhea. No fevers at home. Patient's  is concerned that she may have a urinary tract infection. Patient has a history of hemorrhagic stroke several years ago and left her with some balance difficulties but she is typically able to get up using her walker to get to the bathroom and get back to bed by herself however over the past 2 days she has been very weak and has not been able to get to the bathroom on her own. She is also had some confused speech beyond what she typically has. PCP: Thao Zacarias DO        Past History     Past Medical History:  History reviewed. No pertinent past medical history. Past Surgical History:  History reviewed. No pertinent surgical history. Family History:  History reviewed. No pertinent family history. Social History:  Social History     Tobacco Use    Smoking status: Not on file    Smokeless tobacco: Not on file   Substance Use Topics    Alcohol use: Not on file    Drug use: Not on file       Allergies:  No Known Allergies    Review of Systems   Review of Systems   Constitutional: Negative for chills and fever. Respiratory: Negative for chest tightness and shortness of breath. Cardiovascular: Negative for chest pain. Gastrointestinal: Positive for abdominal pain. Negative for diarrhea, nausea and vomiting. Musculoskeletal: Positive for back pain. Negative for neck pain.    Neurological: Positive for weakness. Negative for numbness. All other systems reviewed and are negative. Physical Exam     Vitals:    07/14/22 2106 07/14/22 2121 07/14/22 2136 07/14/22 2142   BP: (!) 142/74 (!) 149/71 (!) 144/71 137/73   Pulse:       Resp:       Temp:       SpO2: 99% 98% 100% 97%   Weight:         Physical Exam  Vitals and nursing note reviewed. Constitutional:       Appearance: She is well-developed. Comments: Frail elderly female lying on stretcher, alert, confused    HENT:      Head: Normocephalic and atraumatic. Eyes:      Extraocular Movements: Extraocular movements intact. Pupils: Pupils are equal, round, and reactive to light. Cardiovascular:      Rate and Rhythm: Normal rate and regular rhythm. Heart sounds: Normal heart sounds. No murmur heard. Pulmonary:      Effort: Pulmonary effort is normal. No respiratory distress. Breath sounds: Normal breath sounds. No wheezing or rales. Abdominal:      General: Bowel sounds are normal.      Palpations: Abdomen is soft. Tenderness: There is abdominal tenderness in the right lower quadrant and suprapubic area. There is left CVA tenderness. There is no right CVA tenderness. Musculoskeletal:      Cervical back: Normal range of motion and neck supple. Skin:     General: Skin is warm and dry. Neurological:      General: No focal deficit present. Mental Status: She is alert.    Psychiatric:         Judgment: Judgment normal.         Diagnostic Study Results     Labs:     Recent Results (from the past 12 hour(s))   CBC WITH AUTOMATED DIFF    Collection Time: 07/14/22  4:04 PM   Result Value Ref Range    WBC 8.3 4.6 - 13.2 K/uL    RBC 3.41 (L) 4.20 - 5.30 M/uL    HGB 9.8 (L) 12.0 - 16.0 g/dL    HCT 31.1 (L) 35.0 - 45.0 %    MCV 91.2 78.0 - 100.0 FL    MCH 28.7 24.0 - 34.0 PG    MCHC 31.5 31.0 - 37.0 g/dL    RDW 13.3 11.6 - 14.5 %    PLATELET 935 906 - 935 K/uL    MPV 11.5 9.2 - 11.8 FL    NRBC 0.0 0  WBC ABSOLUTE NRBC 0.00 0.00 - 0.01 K/uL    NEUTROPHILS 78 (H) 40 - 73 %    LYMPHOCYTES 13 (L) 21 - 52 %    MONOCYTES 6 3 - 10 %    EOSINOPHILS 1 0 - 5 %    BASOPHILS 1 0 - 2 %    IMMATURE GRANULOCYTES 0 0.0 - 0.5 %    ABS. NEUTROPHILS 6.5 1.8 - 8.0 K/UL    ABS. LYMPHOCYTES 1.1 0.9 - 3.6 K/UL    ABS. MONOCYTES 0.5 0.05 - 1.2 K/UL    ABS. EOSINOPHILS 0.1 0.0 - 0.4 K/UL    ABS. BASOPHILS 0.1 0.0 - 0.1 K/UL    ABS. IMM. GRANS. 0.0 0.00 - 0.04 K/UL    DF AUTOMATED     METABOLIC PANEL, BASIC    Collection Time: 07/14/22  4:04 PM   Result Value Ref Range    Sodium 139 136 - 145 mmol/L    Potassium 4.4 3.5 - 5.5 mmol/L    Chloride 106 100 - 111 mmol/L    CO2 29 21 - 32 mmol/L    Anion gap 4 3.0 - 18 mmol/L    Glucose 113 (H) 74 - 99 mg/dL    BUN 22 (H) 7.0 - 18 MG/DL    Creatinine 1.17 0.6 - 1.3 MG/DL    BUN/Creatinine ratio 19 12 - 20      GFR est AA 52 (L) >60 ml/min/1.73m2    GFR est non-AA 43 (L) >60 ml/min/1.73m2    Calcium 8.7 8.5 - 10.1 MG/DL   LIPASE    Collection Time: 07/14/22  4:04 PM   Result Value Ref Range    Lipase 105 73 - 393 U/L   HEPATIC FUNCTION PANEL    Collection Time: 07/14/22  4:04 PM   Result Value Ref Range    Protein, total 6.6 6.4 - 8.2 g/dL    Albumin 3.1 (L) 3.4 - 5.0 g/dL    Globulin 3.5 2.0 - 4.0 g/dL    A-G Ratio 0.9 0.8 - 1.7      Bilirubin, total 0.3 0.2 - 1.0 MG/DL    Bilirubin, direct 0.1 0.0 - 0.2 MG/DL    Alk.  phosphatase 88 45 - 117 U/L    AST (SGOT) 20 10 - 38 U/L    ALT (SGPT) 17 13 - 56 U/L   EKG, 12 LEAD, INITIAL    Collection Time: 07/14/22  5:17 PM   Result Value Ref Range    Ventricular Rate 92 BPM    Atrial Rate 92 BPM    P-R Interval 190 ms    QRS Duration 70 ms    Q-T Interval 340 ms    QTC Calculation (Bezet) 420 ms    Calculated P Axis 51 degrees    Calculated R Axis 9 degrees    Calculated T Axis 48 degrees    Diagnosis       Normal sinus rhythm  Normal ECG  No previous ECGs available     URINALYSIS W/ RFLX MICROSCOPIC    Collection Time: 07/14/22  5:30 PM   Result Value Ref Range    Color YELLOW      Appearance CLOUDY      Specific gravity 1.012 1.005 - 1.030      pH (UA) 7.5 5.0 - 8.0      Protein TRACE (A) NEG mg/dL    Glucose Negative NEG mg/dL    Ketone Negative NEG mg/dL    Bilirubin Negative NEG      Blood MODERATE (A) NEG      Urobilinogen 0.2 0.2 - 1.0 EU/dL    Nitrites Positive (A) NEG      Leukocyte Esterase LARGE (A) NEG     URINE MICROSCOPIC ONLY    Collection Time: 07/14/22  5:30 PM   Result Value Ref Range    WBC 21 to 35 0 - 5 /hpf    RBC 4 to 10 0 - 5 /hpf    Epithelial cells 2+ 0 - 5 /lpf    Bacteria 4+ (A) NEG /hpf       Radiologic Studies:   CT ABD PELV WO CONT   Final Result      1. Extensive colonic diverticulosis with nonspecific abnormal stranding   surrounding splenic flexure without definite colonic wall thickening, findings   may still be related to acute diverticulitis. No evidence of abscess or free   air. 2. No other acute abdominal or pelvic CT finding. 3. Minimal bibasilar atelectasis and small pleural effusions. 4. Mild superior endplate fracture with mild loss of height L4 vertebral body   probably chronic but age not definitely determined. CT HEAD WO CONT   Final Result      1. No evidence of acute intracranial finding. 2. Chronic encephalomalacia/gliosis, possible sequela of chronic infarct   involving right cerebellum and medial left cerebellum. White matter hypodensity   likely chronic microvascular ischemic disease. Diffuse volume loss. XR ABD (KUB)   Final Result      No acute cardiopulmonary abnormality. Cardiomegaly. Nonobstructive bowel gas pattern. XR CHEST PORT   Final Result      No acute cardiopulmonary abnormality. Cardiomegaly. Nonobstructive bowel gas pattern. CT Results  (Last 48 hours)               07/14/22 6312  CT ABD PELV WO CONT Final result    Impression:      1.  Extensive colonic diverticulosis with nonspecific abnormal stranding   surrounding splenic flexure without definite colonic wall thickening, findings   may still be related to acute diverticulitis. No evidence of abscess or free   air. 2. No other acute abdominal or pelvic CT finding. 3. Minimal bibasilar atelectasis and small pleural effusions. 4. Mild superior endplate fracture with mild loss of height L4 vertebral body   probably chronic but age not definitely determined. Narrative:  EXAM:CT abdomen pelvis without contrast       CLINICAL INDICATION/HISTORY: Right lower quadrant and left-sided flank pain       COMPARISON: None. TECHNIQUE: Standard helical CT performed through the abdomen and pelvis without   contrast.  Coronal and sagittal reformations were generated. One or more dose   reduction techniques were used on this CT: automated exposure control,   adjustment of the mAs and/or kVp according to patient's size, and iterative   reconstruction techniques. The specific techniques utilized on this CT exam have   been documented in the patient's electronic medical record. Digital imaging and   communications and medicine (DICOM) format image data are available to   nonaffiliated external healthcare facilities or entities on a secure, media   free, reciprocally searchable basis with patient authorization for at least a 12   month period after this study. _______________       FINDINGS:       INFERIOR THORAX: Minimal bibasilar atelectasis with minimal bilateral pleural   effusions. Moderate diffuse cardiac enlargement. LIVER/BILIARY: No suspicious liver lesion. No biliary dilation. Cholecystectomy. SPLEEN: Normal.       PANCREAS: Normal.       ADRENALS: Normal.       KIDNEYS: Mild bilateral renal atrophy and mild nonspecific bilateral perirenal   stranding with no evidence of urinary system calculus or obstruction or renal   mass. Bladder unremarkable. LYMPH NODES: No technically enlarged nodes.        GI TRACT: Numerous colonic diverticuli with some abnormal pericolonic stranding   splenic flexure without definite wall thickening. No abnormal extraluminal fluid   collection or abnormal intraperitoneal air. No abnormal small or large bowel   wall thickening or dilatation. Appendix not visualized with no abnormal   inflammation surrounding cecum. VASCULATURE: Severe aortoiliac atherosclerotic calcification without evidence of   aneurysm. PELVIC ORGANS: Hysterectomy with no abnormal mass. OTHER: Mild superior endplate concave deformity L4 vertebral body with mild loss   of height, probably chronic but age not definitely determined. Mild scoliotic   curvature convex to the left upper lumbar spine. No other acute or aggressive   osseous findings. _______________           07/14/22 1852  CT HEAD WO CONT Final result    Impression:      1. No evidence of acute intracranial finding. 2. Chronic encephalomalacia/gliosis, possible sequela of chronic infarct   involving right cerebellum and medial left cerebellum. White matter hypodensity   likely chronic microvascular ischemic disease. Diffuse volume loss. Narrative:  EXAM: CT head       CLINICAL INDICATION/HISTORY: Altered level of consciousness. COMPARISON: None. TECHNIQUE: Axial CT imaging of the head  was obtained from skull base to vertex   without intravenous contrast. Coronal and sagittal reconstructions were   obtained. One or more dose reduction techniques were used on this CT: automated   exposure control, adjustment of the mAs and/or kVp according to patient's size,   and iterative reconstruction techniques. The specific techniques utilized on   this CT exam have been documented in the patient's electronic medical record.     Digital imaging and communications and medicine (DICOM) format image data are   available to nonaffiliated external healthcare facilities or entities on a   secure, media free, reciprocally searchable basis with patient authorization for at least a 12 month period after this study. _______________       FINDINGS:       BRAIN AND POSTERIOR FOSSA: Moderate diffuse central and cortical volume loss. There is no intracranial hemorrhage, mass effect, or shift of midline   structures. There is chronic appearing encephalomalacia and gliosis right   cerebellum and medial left cerebellum. Mild to moderate confluent and patchy   hypodensity bilateral cerebral white matter. EXTRA-AXIAL SPACES AND MENINGES: There are no abnormal extra-axial fluid   collections. CALVARIUM: Previous bilateral frontal craniotomy with no acute or aggressive   osseous finding. SINUSES: The visualized portions of the paranasal sinuses and mastoid air cells   are well aerated. OTHER: None.       _______________               CXR Results  (Last 48 hours)               07/14/22 1627  XR CHEST PORT Final result    Impression:      No acute cardiopulmonary abnormality. Cardiomegaly. Nonobstructive bowel gas pattern. Narrative:  EXAM: CHEST AND ABDOMINAL RADIOGRAPHS       CLINICAL INDICATION/HISTORY: pain   -Additional: None       COMPARISON: None       TECHNIQUE: Frontal views of the chest and abdomen.       _______________       FINDINGS:       CHEST: Cardiomegaly. Chronic interstitial markings. No dense consolidation,   large effusion or pneumothorax. BOWEL GAS PATTERN: No evidence of obstruction. No visible free air, given   limitations of supine view. BONES: Unremarkable. OTHER: Prior cholecystectomy. _______________                 Medical Decision Making   I am the first provider for this patient. I reviewed the vital signs, available nursing notes, past medical history, past surgical history, family history and social history. Vital Signs: Reviewed the patient's vital signs.     Pulse Oximetry Analysis: 97% on RA     EKG interpretation: (Preliminary)  4:57 PM   Normal sinus rhythm rate 92 bpm no STEMI  EKG read by Rufino Rodarte PA-C   at ,6:26 PM      Records Reviewed: Nursing Notes, Old Medical Records and Previous electrocardiograms    Procedures:  Procedures    ED Course:   4:57 PM Initial assessment performed. The patients presenting problems have been discussed, and they are in agreement with the care plan formulated and outlined with them. I have encouraged them to ask questions as they arise throughout their visit. Case discussed with Dr. Cat Cid, see face to face     10:18 PM  Case discussed with general surgery, Raquel Allan, will consult during hospital stay      Core Measures:  For Hospitalized Patients:    1. Hospitalization Decision Time:  The decision to hospitalize the patient was made by Rufino Rodarte PA-C   at 9:41 PM   on 7/14/2022    2. Aspirin: Aspirin was not given because the patient did not present with a stroke at the time of their Emergency Department evaluation    9:41 PM  Patient is being admitted to the hospital by Milton Contreras. The results of their tests and reasons for their admission have been discussed with them and/or available family. They convey agreement and understanding for the need to be admitted and for their admission diagnosis. CONDITIONS ON ADMISSION:  Sepsis is not present at the time of admission. Deep Vein Thrombosis is not present at the time of admission. Thrombosis is not present at the time of admission. Urinary Tract Infection is present at the time of admission. Pneumonia is not present at the time of admission. MRSA is not present at the time of admission. Wound infection is not present at the time of admission. Pressure Ulcer is not present at the time of admission. CLINICAL IMPRESSION:    1. Altered mental status, unspecified altered mental status type    2. Diverticulitis of large intestine without perforation or abscess without bleeding    3.  Urinary tract infection with hematuria, site unspecified          Discussion:  Pt presents with mental status. Complaining of right lower quadrant pain and left flank pain. Has seemed more weak recently no focal deficits just generalized weakness. She does have a UTI. Urine sent for culture. Given IV Rocephin and Zosyn after CT shows diverticulosis with mild diverticulitis. Case discussed with Dr. Santiago Ruvalcaba hospitalist will admit would like general surgery consulted in case this could represent ischemic findings. Spoke with general surgery states that the area they see diverticulosis is more prone to ischemic colitis. Lactic pending. States she will follow along but patient likely just needs resuscitative fluids. Diagnosis and Disposition     . CLINICAL IMPRESSION:    1. Altered mental status, unspecified altered mental status type    2. Diverticulitis of large intestine without perforation or abscess without bleeding    3. Urinary tract infection with hematuria, site unspecified        PLAN:  1. Admit          Please note that this dictation was completed with [employer], the computer voice recognition software. Quite often unanticipated grammatical, syntax, homophones, and other interpretive errors are inadvertently transcribed by the computer software. Please disregard these errors. Please excuse any errors that have escaped final proofreading.

## 2022-07-14 NOTE — ED NOTES
Line placed to [facility] and labs sent. Pt taken off floor to x-ray.

## 2022-07-14 NOTE — ED TRIAGE NOTES
Pt sent by  and caregiver for evaluation of possible abd pain and possibly uti;  Pt also not wanting to ambulate as much;  Normally ambulates with assist of gait belt to move from chair to bed etc.  Pt arrives smiling and pleasant, answers yes to all questions;   Family report pt is urinating and had bowel movement today;

## 2022-07-15 ENCOUNTER — APPOINTMENT (OUTPATIENT)
Dept: NON INVASIVE DIAGNOSTICS | Age: 87
DRG: 690 | End: 2022-07-15
Attending: FAMILY MEDICINE
Payer: MEDICARE

## 2022-07-15 PROBLEM — K57.32 SIGMOID DIVERTICULITIS: Status: ACTIVE | Noted: 2022-07-15

## 2022-07-15 PROBLEM — I10 HTN (HYPERTENSION): Status: ACTIVE | Noted: 2022-07-15

## 2022-07-15 PROBLEM — R53.1 WEAKNESS GENERALIZED: Status: ACTIVE | Noted: 2022-07-15

## 2022-07-15 PROBLEM — F03.90 DEMENTIA (HCC): Status: ACTIVE | Noted: 2022-07-15

## 2022-07-15 PROBLEM — R01.1 HEART MURMUR: Status: ACTIVE | Noted: 2022-07-15

## 2022-07-15 PROBLEM — I69.359 HISTORY OF HEMORRHAGIC STROKE WITH RESIDUAL HEMIPLEGIA (HCC): Status: ACTIVE | Noted: 2022-07-15

## 2022-07-15 PROBLEM — K57.30 DIVERTICULOSIS OF COLON: Status: ACTIVE | Noted: 2022-07-15

## 2022-07-15 LAB
ALBUMIN SERPL-MCNC: 3.1 G/DL (ref 3.4–5)
ALBUMIN/GLOB SERPL: 0.9 {RATIO} (ref 0.8–1.7)
ALP SERPL-CCNC: 83 U/L (ref 45–117)
ALT SERPL-CCNC: 17 U/L (ref 13–56)
ANION GAP SERPL CALC-SCNC: 5 MMOL/L (ref 3–18)
AST SERPL-CCNC: 18 U/L (ref 10–38)
BILIRUB SERPL-MCNC: 0.4 MG/DL (ref 0.2–1)
BUN SERPL-MCNC: 19 MG/DL (ref 7–18)
BUN/CREAT SERPL: 19 (ref 12–20)
CALCIUM SERPL-MCNC: 8.3 MG/DL (ref 8.5–10.1)
CHLORIDE SERPL-SCNC: 106 MMOL/L (ref 100–111)
CO2 SERPL-SCNC: 27 MMOL/L (ref 21–32)
CREAT SERPL-MCNC: 1.01 MG/DL (ref 0.6–1.3)
ECHO AO ASC DIAM: 2.8 CM
ECHO AO ASCENDING AORTA INDEX: 1.88 CM/M2
ECHO AV AREA PEAK VELOCITY: 0.9 CM2
ECHO AV AREA PEAK VELOCITY: 1509.8 CM2
ECHO AV AREA VTI: 0.8 CM2
ECHO AV AREA/BSA VTI: 0.5 CM2/M2
ECHO AV MEAN GRADIENT: 34 MMHG
ECHO AV MEAN VELOCITY: 2.8 M/S
ECHO AV PEAK GRADIENT: 0 MILLIMETER OF MERCURY COLUMN
ECHO AV PEAK GRADIENT: 53 MMHG
ECHO AV PEAK VELOCITY: 0 M/S
ECHO AV PEAK VELOCITY: 3.6 M/S
ECHO AV VTI: 74.1 CM
ECHO EST RA PRESSURE: 3 MMHG
ECHO LA DIAMETER INDEX: 1.88 CM/M2
ECHO LA DIAMETER: 2.8 CM
ECHO LA VOL 2C: 86 ML (ref 22–52)
ECHO LA VOL 4C: 91 ML (ref 22–52)
ECHO LA VOL BP: 96 ML (ref 22–52)
ECHO LA VOL/BSA BIPLANE: 64 ML/M2 (ref 16–34)
ECHO LA VOLUME AREA LENGTH: 101 ML
ECHO LA VOLUME INDEX A2C: 58 ML/M2 (ref 16–34)
ECHO LA VOLUME INDEX A4C: 61 ML/M2 (ref 16–34)
ECHO LA VOLUME INDEX AREA LENGTH: 68 ML/M2 (ref 16–34)
ECHO LV E' LATERAL VELOCITY: 4 CM/S
ECHO LV E' SEPTAL VELOCITY: 6 CM/S
ECHO LV EDV A2C: 60 ML
ECHO LV EDV A4C: 63 ML
ECHO LV EDV BP: 62 ML (ref 56–104)
ECHO LV EDV INDEX A4C: 42 ML/M2
ECHO LV EDV INDEX BP: 42 ML/M2
ECHO LV EDV NDEX A2C: 40 ML/M2
ECHO LV EJECTION FRACTION A2C: 64 %
ECHO LV EJECTION FRACTION A4C: 54 %
ECHO LV EJECTION FRACTION BIPLANE: 57 % (ref 55–100)
ECHO LV ESV A2C: 22 ML
ECHO LV ESV A4C: 29 ML
ECHO LV ESV BP: 26 ML (ref 19–49)
ECHO LV ESV INDEX A2C: 15 ML/M2
ECHO LV ESV INDEX A4C: 19 ML/M2
ECHO LV ESV INDEX BP: 17 ML/M2
ECHO LV FRACTIONAL SHORTENING: 35 % (ref 28–44)
ECHO LV INTERNAL DIMENSION DIASTOLE INDEX: 2.08 CM/M2
ECHO LV INTERNAL DIMENSION DIASTOLIC: 3.1 CM (ref 3.9–5.3)
ECHO LV INTERNAL DIMENSION SYSTOLIC INDEX: 1.34 CM/M2
ECHO LV INTERNAL DIMENSION SYSTOLIC: 2 CM
ECHO LV IVSD: 1.3 CM (ref 0.6–0.9)
ECHO LV MASS 2D: 129.9 G (ref 67–162)
ECHO LV MASS INDEX 2D: 87.2 G/M2 (ref 43–95)
ECHO LV POSTERIOR WALL DIASTOLIC: 1.3 CM (ref 0.6–0.9)
ECHO LV RELATIVE WALL THICKNESS RATIO: 0.84
ECHO LVOT AREA: 3.1 CM2
ECHO LVOT AV VTI INDEX: 0.26
ECHO LVOT DIAM: 2 CM
ECHO LVOT MEAN GRADIENT: 2 MMHG
ECHO LVOT PEAK GRADIENT: 4 MMHG
ECHO LVOT PEAK VELOCITY: 1 M/S
ECHO LVOT STROKE VOLUME INDEX: 40 ML/M2
ECHO LVOT SV: 59.7 ML
ECHO LVOT VTI: 19 CM
ECHO MV A VELOCITY: 1.71 M/S
ECHO MV AREA PHT: 2.7 CM2
ECHO MV AREA VTI: 1.8 CM2
ECHO MV E DECELERATION TIME (DT): 228.4 MS
ECHO MV E VELOCITY: 1.16 M/S
ECHO MV E/A RATIO: 0.68
ECHO MV E/E' LATERAL: 29
ECHO MV E/E' RATIO (AVERAGED): 24.17
ECHO MV E/E' SEPTAL: 19.33
ECHO MV LVOT VTI INDEX: 1.72
ECHO MV MAX VELOCITY: 1.8 M/S
ECHO MV MEAN GRADIENT: 8 MMHG
ECHO MV MEAN VELOCITY: 1.4 M/S
ECHO MV PEAK GRADIENT: 13 MMHG
ECHO MV PRESSURE HALF TIME (PHT): 80.9 MS
ECHO MV REGURGITANT PEAK GRADIENT: 12 MMHG
ECHO MV REGURGITANT PEAK VELOCITY: 1.7 M/S
ECHO MV REGURGITANT VTIA: 28.1 CM
ECHO MV VTI: 32.6 CM
ECHO RIGHT VENTRICULAR SYSTOLIC PRESSURE (RVSP): 35 MMHG
ECHO RV FREE WALL PEAK S': 17 CM/S
ECHO RV INTERNAL DIMENSION: 3.4 CM
ECHO RV TAPSE: 2.7 CM (ref 1.7–?)
ECHO TV REGURGITANT MAX VELOCITY: 2.84 M/S
ECHO TV REGURGITANT PEAK GRADIENT: 32 MMHG
ERYTHROCYTE [DISTWIDTH] IN BLOOD BY AUTOMATED COUNT: 13 % (ref 11.6–14.5)
GLOBULIN SER CALC-MCNC: 3.3 G/DL (ref 2–4)
GLUCOSE SERPL-MCNC: 107 MG/DL (ref 74–99)
HCT VFR BLD AUTO: 31.2 % (ref 35–45)
HGB BLD-MCNC: 10 G/DL (ref 12–16)
MCH RBC QN AUTO: 29.6 PG (ref 24–34)
MCHC RBC AUTO-ENTMCNC: 32.1 G/DL (ref 31–37)
MCV RBC AUTO: 92.3 FL (ref 78–100)
NRBC # BLD: 0 K/UL (ref 0–0.01)
NRBC BLD-RTO: 0 PER 100 WBC
PLATELET # BLD AUTO: 198 K/UL (ref 135–420)
PMV BLD AUTO: 11.6 FL (ref 9.2–11.8)
POTASSIUM SERPL-SCNC: 4 MMOL/L (ref 3.5–5.5)
PROT SERPL-MCNC: 6.4 G/DL (ref 6.4–8.2)
RBC # BLD AUTO: 3.38 M/UL (ref 4.2–5.3)
SODIUM SERPL-SCNC: 138 MMOL/L (ref 136–145)
WBC # BLD AUTO: 9.4 K/UL (ref 4.6–13.2)

## 2022-07-15 PROCEDURE — 80053 COMPREHEN METABOLIC PANEL: CPT

## 2022-07-15 PROCEDURE — 36415 COLL VENOUS BLD VENIPUNCTURE: CPT

## 2022-07-15 PROCEDURE — 85027 COMPLETE CBC AUTOMATED: CPT

## 2022-07-15 PROCEDURE — 74011250636 HC RX REV CODE- 250/636: Performed by: FAMILY MEDICINE

## 2022-07-15 PROCEDURE — 93306 TTE W/DOPPLER COMPLETE: CPT

## 2022-07-15 PROCEDURE — 74011250637 HC RX REV CODE- 250/637: Performed by: FAMILY MEDICINE

## 2022-07-15 PROCEDURE — 99221 1ST HOSP IP/OBS SF/LOW 40: CPT | Performed by: NURSE PRACTITIONER

## 2022-07-15 PROCEDURE — 74011000258 HC RX REV CODE- 258: Performed by: FAMILY MEDICINE

## 2022-07-15 PROCEDURE — 97116 GAIT TRAINING THERAPY: CPT

## 2022-07-15 PROCEDURE — 65270000029 HC RM PRIVATE

## 2022-07-15 PROCEDURE — 74011000250 HC RX REV CODE- 250: Performed by: FAMILY MEDICINE

## 2022-07-15 PROCEDURE — 97162 PT EVAL MOD COMPLEX 30 MIN: CPT

## 2022-07-15 RX ORDER — ONDANSETRON 4 MG/1
4 TABLET, FILM COATED ORAL EVERY EVENING
COMMUNITY

## 2022-07-15 RX ORDER — ROSUVASTATIN CALCIUM 10 MG/1
10 TABLET, COATED ORAL
COMMUNITY

## 2022-07-15 RX ORDER — ONDANSETRON HYDROCHLORIDE 8 MG/1
8 TABLET, FILM COATED ORAL EVERY MORNING
COMMUNITY

## 2022-07-15 RX ORDER — FAMOTIDINE 20 MG/1
20 TABLET, FILM COATED ORAL DAILY
COMMUNITY

## 2022-07-15 RX ORDER — AMLODIPINE BESYLATE 10 MG/1
10 TABLET ORAL DAILY
COMMUNITY

## 2022-07-15 RX ORDER — PAROXETINE 10 MG/1
10 TABLET, FILM COATED ORAL DAILY
Status: DISCONTINUED | OUTPATIENT
Start: 2022-07-15 | End: 2022-07-18 | Stop reason: HOSPADM

## 2022-07-15 RX ORDER — FEXOFENADINE HCL AND PSEUDOEPHEDRINE HCI 60; 120 MG/1; MG/1
1 TABLET, EXTENDED RELEASE ORAL EVERY 12 HOURS
COMMUNITY

## 2022-07-15 RX ORDER — PAROXETINE 10 MG/1
10 TABLET, FILM COATED ORAL DAILY
COMMUNITY

## 2022-07-15 RX ORDER — LANOLIN ALCOHOL/MO/W.PET/CERES
1000 CREAM (GRAM) TOPICAL DAILY
Status: DISCONTINUED | OUTPATIENT
Start: 2022-07-15 | End: 2022-07-18 | Stop reason: HOSPADM

## 2022-07-15 RX ORDER — AMLODIPINE BESYLATE 5 MG/1
10 TABLET ORAL DAILY
Status: DISCONTINUED | OUTPATIENT
Start: 2022-07-15 | End: 2022-07-18 | Stop reason: HOSPADM

## 2022-07-15 RX ORDER — LANOLIN ALCOHOL/MO/W.PET/CERES
1000 CREAM (GRAM) TOPICAL DAILY
COMMUNITY

## 2022-07-15 RX ORDER — ENOXAPARIN SODIUM 100 MG/ML
30 INJECTION SUBCUTANEOUS DAILY
Status: DISCONTINUED | OUTPATIENT
Start: 2022-07-16 | End: 2022-07-18 | Stop reason: HOSPADM

## 2022-07-15 RX ORDER — MELATONIN
2000 DAILY
Status: DISCONTINUED | OUTPATIENT
Start: 2022-07-15 | End: 2022-07-18 | Stop reason: HOSPADM

## 2022-07-15 RX ORDER — CHOLECALCIFEROL (VITAMIN D3) 125 MCG
2000 CAPSULE ORAL DAILY
COMMUNITY

## 2022-07-15 RX ORDER — POLYETHYLENE GLYCOL 3350 17 G/17G
17 POWDER, FOR SOLUTION ORAL DAILY
COMMUNITY

## 2022-07-15 RX ORDER — ROSUVASTATIN CALCIUM 10 MG/1
10 TABLET, COATED ORAL
Status: DISCONTINUED | OUTPATIENT
Start: 2022-07-15 | End: 2022-07-18 | Stop reason: HOSPADM

## 2022-07-15 RX ADMIN — ENOXAPARIN SODIUM 40 MG: 100 INJECTION SUBCUTANEOUS at 09:07

## 2022-07-15 RX ADMIN — CYANOCOBALAMIN TAB 1000 MCG 1000 MCG: 1000 TAB at 09:03

## 2022-07-15 RX ADMIN — PAROXETINE HYDROCHLORIDE 10 MG: 10 TABLET, FILM COATED ORAL at 09:03

## 2022-07-15 RX ADMIN — PIPERACILLIN AND TAZOBACTAM 3.38 G: 3; .375 INJECTION, POWDER, LYOPHILIZED, FOR SOLUTION INTRAVENOUS at 22:40

## 2022-07-15 RX ADMIN — SODIUM CHLORIDE, PRESERVATIVE FREE 10 ML: 5 INJECTION INTRAVENOUS at 22:00

## 2022-07-15 RX ADMIN — SODIUM CHLORIDE 100 ML/HR: 9 INJECTION, SOLUTION INTRAVENOUS at 00:19

## 2022-07-15 RX ADMIN — PIPERACILLIN AND TAZOBACTAM 3.38 G: 3; .375 INJECTION, POWDER, LYOPHILIZED, FOR SOLUTION INTRAVENOUS at 03:15

## 2022-07-15 RX ADMIN — ROSUVASTATIN CALCIUM 10 MG: 10 TABLET, FILM COATED ORAL at 22:00

## 2022-07-15 RX ADMIN — ONDANSETRON 4 MG: 2 INJECTION, SOLUTION INTRAMUSCULAR; INTRAVENOUS at 00:20

## 2022-07-15 RX ADMIN — SODIUM CHLORIDE, PRESERVATIVE FREE 10 ML: 5 INJECTION INTRAVENOUS at 00:20

## 2022-07-15 RX ADMIN — PIPERACILLIN AND TAZOBACTAM 3.38 G: 3; .375 INJECTION, POWDER, LYOPHILIZED, FOR SOLUTION INTRAVENOUS at 09:11

## 2022-07-15 RX ADMIN — SODIUM CHLORIDE, PRESERVATIVE FREE 10 ML: 5 INJECTION INTRAVENOUS at 14:00

## 2022-07-15 RX ADMIN — FAMOTIDINE 20 MG: 10 INJECTION, SOLUTION INTRAVENOUS at 08:56

## 2022-07-15 RX ADMIN — FAMOTIDINE 20 MG: 10 INJECTION, SOLUTION INTRAVENOUS at 00:19

## 2022-07-15 RX ADMIN — Medication 2000 UNITS: at 09:03

## 2022-07-15 RX ADMIN — PIPERACILLIN AND TAZOBACTAM 3.38 G: 3; .375 INJECTION, POWDER, LYOPHILIZED, FOR SOLUTION INTRAVENOUS at 16:23

## 2022-07-15 NOTE — PROGRESS NOTES
Problem: Pressure Injury - Risk of  Goal: *Prevention of pressure injury  Description: Document Luca Scale and appropriate interventions in the flowsheet. Outcome: Progressing Towards Goal  Note: Pressure Injury Interventions:  Sensory Interventions: Minimize linen layers,Discuss PT/OT consult with provider    Moisture Interventions: Absorbent underpads,Limit adult briefs,Check for incontinence Q2 hours and as needed,Minimize layers    Activity Interventions: PT/OT evaluation,Pressure redistribution bed/mattress(bed type)    Mobility Interventions: PT/OT evaluation,Pressure redistribution bed/mattress (bed type)    Nutrition Interventions: Document food/fluid/supplement intake                     Problem: Patient Education: Go to Patient Education Activity  Goal: Patient/Family Education  Outcome: Progressing Towards Goal     Problem: Falls - Risk of  Goal: *Absence of Falls  Description: Document Sherrill Fall Risk and appropriate interventions in the flowsheet.   Outcome: Progressing Towards Goal  Note: Fall Risk Interventions:  Mobility Interventions: PT Consult for mobility concerns,PT Consult for assist device competence,Patient to call before getting OOB    Mentation Interventions: Bed/chair exit alarm,Room close to nurse's station,Door open when patient unattended,Adequate sleep, hydration, pain control         Elimination Interventions: Call light in reach,Bed/chair exit alarm              Problem: Patient Education: Go to Patient Education Activity  Goal: Patient/Family Education  Outcome: Progressing Towards Goal

## 2022-07-15 NOTE — PROGRESS NOTES
Pharmacy Dosing Services:  Famotidine    Indication:  SUP Prophylaxis    Previous Regimen Famotidine 20 mg IV q12hrs   Serum Creatinine Lab Results   Component Value Date/Time    Creatinine 1.01 07/15/2022 12:30 AM      Creatinine Clearance Estimated Creatinine Clearance: 25.8 mL/min (based on SCr of 1.01 mg/dL). BUN Lab Results   Component Value Date/Time    BUN 19 (H) 07/15/2022 12:30 AM         Dose administration notes: Will change to Famotidine 20 mg IV Daily per renal dosing protocol    Plan:  Continue to monitor     Jesse Farrar [facility]

## 2022-07-15 NOTE — PROGRESS NOTES
Hospitalist Progress Note    Patient: Louise Ward MRN: [id number]  CSN: [id number]    YOB: 1927  Age: 80 y.o. Sex: female    DOA: 7/14/2022 LOS:  LOS: 1 day          Chief Complaint:    UTI      Assessment/Plan     66-year-old female with hypertension and history of hemorrhagic stroke with residual weakness and dementia is admitted for generalized weakness with a UTI and diverticulitis of the large intestine in the setting of extensive colonic diverticulosis.     Generalized weakness-at baseline she uses a gait belt to ambulate  PT consult     Diverticulitis of the large intestine with extensive colonic diverticulosis-no perforation or abscess seen on CT with benign clinical exam  Surgery recommends abx, monitor  We will let her eat as tolerated     UTI  This may be biggest issue for her follow cx results, continue IV abx     Heart murmur-harsh systolic ejection murmur with pleural effusions on chest xray  Echo done, result pending     Hypertension  Continue amlodipine daily     Dementia     History of hemorrhagic stroke with residual weakness     Full code     Prophylaxis: Pepcid IV, Lovenox SQ     Disposition :  Patient Active Problem List   Diagnosis Code    UTI (urinary tract infection) N39.0    HTN (hypertension) I10    Dementia ([facility] [address] F03.90    History of hemorrhagic stroke with residual hemiplegia ([facility] [address] I69.359    Diverticulosis of colon K57.30    Diverticulitis large intestine K57.32    Weakness generalized R53.1    Heart murmur R01.1       Subjective:    She is pleasantly demented  Awake  Not really talking or answering questions    Review of systems:    UTO due to dementia      Vital signs/Intake and Output:  Visit Vitals  BP (!) 147/84 (BP 1 Location: Left upper arm, BP Patient Position: At rest)   Pulse 89   Temp 98.8 °F (37.1 °C)   Resp 17   Wt 49 kg (108 lb)   SpO2 97%     Current Shift:  No intake/output data recorded.   Last three shifts:  07/13 1901 - 07/15 0700  In: 150 [I.V.:150]  Out: -     Exam:    General: eldelry demented WF NAD  CVS:Regular rate and rhythm, no M/R/G, S1/S2 heard, no thrill  Lungs:Clear to auscultation bilaterally, no wheezes, rhonchi, or rales  Abdomen: Soft, Nontender, No distention, Normal Bowel sounds  Extremities: No C/C/E, pulses palpable 2+  Neuro:grossly normal , follows commands  Psych:appropriate                Labs: Results:       Chemistry Recent Labs     07/15/22  0030 07/14/22  1604   * 113*    139   K 4.0 4.4    106   CO2 27 29   BUN 19* 22*   CREA 1.01 1.17   CA 8.3* 8.7   AGAP 5 4   BUCR 19 19   AP 83 88   TP 6.4 6.6   ALB 3.1* 3.1*   GLOB 3.3 3.5   AGRAT 0.9 0.9      CBC w/Diff Recent Labs     07/15/22  0030 07/14/22  1604   WBC 9.4 8.3   RBC 3.38* 3.41*   HGB 10.0* 9.8*   HCT 31.2* 31.1*    222   GRANS  --  78*   LYMPH  --  13*   EOS  --  1      Cardiac Enzymes No results for input(s): CPK, CKND1, ANNA in the last 72 hours. No lab exists for component: CKRMB, TROIP   Coagulation No results for input(s): PTP, INR, APTT, INREXT in the last 72 hours. Lipid Panel No results found for: CHOL, CHOLPOCT, CHOLX, CHLST, CHOLV, 485663, HDL, HDLP, LDL, LDLC, DLDLP, 211291, VLDLC, VLDL, TGLX, TRIGL, TRIGP, TGLPOCT, CHHD, CHHDX   BNP No results for input(s): BNPP in the last 72 hours.    Liver Enzymes Recent Labs     07/15/22  0030   TP 6.4   ALB 3.1*   AP 83      Thyroid Studies No results found for: T4, T3U, TSH, TSHEXT     Procedures/imaging: see electronic medical records for all procedures/Xrays and details which were not copied into this note but were reviewed prior to creation of Misa Stahl MD

## 2022-07-15 NOTE — PROGRESS NOTES
Problem: Mobility Impaired (Adult and Pediatric)  Goal: *Acute Goals and Plan of Care (Insert Text)  Description: Physical Therapy Goals   Initiated 7/15/2022 and to be accomplished within 7 day(s)  1. Patient will move from supine <> sit with SBA in prep for out of bed activity and change of position. 2.  Patient will perform sit<> stand with SBA with RW in prep for transfers/ambulation. 3.  Patient will ambulate 80 feet with S/LRAD for improved functional mobility at discharge. Outcome: Progressing Towards Goal  PHYSICAL THERAPY EVALUATION    Patient: Sabrina Rosa (79 y.o. female)  Date: 7/15/2022  Primary Diagnosis: UTI (urinary tract infection) [N39.0]  Diverticulitis [K57.92]  Precautions: Fall  PLOF: Caregiver Carolyn present and reports pt ambulating with RW and assistance of 1 until this past Tuesday. Lives with spouse in home with 0 BANER. Has caregivers 10am -8/9pm, then pt spouse primary caregiver. ASSESSMENT :  Based on the objective data described below, the patient seen on medical unit and presents with recent increased confusion and decr'd LE motor performance, with subsequent inability to ambulate r/t admitting dx. Pt presents with decr'd independence in overall functional mobility, decr'd balance and gait impairment. Pt care giver present and able to provide information of PLOF. Motions to abdomen as being painful but unable to rate. Pt performed supine >sit and sit <>stand with min assist.  Able to participate with GT/RW/min assist ~10 ft. Tory slow, with narrow base of support. Balance fair. Pt returned to bed with min A and left supine in NAD with PurWick and IV in place. Pt left with all needs in reach, and nurse [facility] notified. Recommend HHPT for follow up physical therapy upon discharge to reach maximal level of independence/safety with functional mobility.      Pt Education: Role of physical therapy in acute care setting, fall prevention and safety/technique during functional mobility tasks      Patient will benefit from skilled intervention to address the above impairments. Patients rehabilitation potential is considered to be Fair  Factors which may influence rehabilitation potential include:   []         None noted  []         Mental ability/status  [x]         Medical condition  []         Home/family situation and support systems  []         Safety awareness  []         Pain tolerance/management  []         Other:      PLAN :  Recommendations and Planned Interventions:  [x]           Bed Mobility Training             []    Neuromuscular Re-Education  [x]           Transfer Training                   []    Orthotic/Prosthetic Training  [x]           Gait Training                          []    Modalities  [x]           Therapeutic Exercises          []    Edema Management/Control  [x]           Therapeutic Activities            [x]    Patient and Family Training/Education  []           Other (comment):    Frequency/Duration: Patient will be followed by physical therapy 1-2 times per day to address goals. Discharge Recommendations: Home Health   Further Equipment Recommendations for Discharge: N/A     SUBJECTIVE:   Patient stated Suellyn Cord.     OBJECTIVE DATA SUMMARY:     Past Medical History:   Diagnosis Date    Dementia ([facility] [address] 7/15/2022    History of hemorrhagic stroke with residual hemiplegia ([facility] [address] 7/15/2022    Hypertension     Stroke [facility])     2014     Past Surgical History:   Procedure Laterality Date    HX CHOLECYSTECTOMY      HX HYSTERECTOMY       Barriers to Learning/Limitations: yes;  sensory deficits-vision/hearing/speech, physical and altered mental status (i.e.Sedation, Confusion)  Compensate with: Visual Cues, Verbal Cues and Tactile Cues  Prior Level of Function/Home Situation:   Home Situation  Home Environment: Private residence  # Steps to Enter: 0  One/Two Story Residence:  (split level with chair lift)  Living Alone: No  Support Systems: Spouse/Significant Other,Caregiver/Home Care Staff  Patient Expects to be Discharged to[de-identified] Home  Current DME Used/Available at Home: Wheelchair,Walker, rolling,Hospital bed  Tub or Shower Type: Other (comment) (sponge baths )  Critical Behavior:  Neurologic State: Alert;Confused  Orientation Level: Oriented to person  Cognition: Decreased attention/concentration;Decreased command following;Memory loss  Safety/Judgement: Fall prevention;Decreased awareness of environment;Decreased awareness of need for assistance;Decreased awareness of need for safety;Decreased insight into deficits  Psychosocial  Patient Behaviors: Calm; Cooperative  Visitor Behaviors: Calm;Supportive (Caregiver Carolyn present)  Purposeful Interaction: Yes  Pt Identified Daily Priority: Clinical issues (comment); Family issues (comment); Social issues (comment)  Caritas Process: Nurture loving kindness;Enable carrillo/hope; Attend basic human needs; Teaching/learning;Create healing environment  Caring Interventions: Reassure; Therapeutic modalities  Reassure: Support family; Acceptance; Therapeutic listening; Sit with patient  Therapeutic Modalities: Music  Strength:    Strength: Generally decreased, functional  Tone & Sensation:   Sensation: Intact (by observation)  Range Of Motion:  AROM: Generally decreased, functional  Functional Mobility:  Bed Mobility:  Supine to Sit: Minimum assistance; Additional time (vc)  Sit to Supine: Minimum assistance (vc)  Scooting: Contact guard assistance (vc)  Transfers:  Sit to Stand: Minimum assistance  Stand to Sit: Minimum assistance  Balance:   Sitting: Intact  Standing: Impaired; With support  Standing - Static: Fair  Standing - Dynamic : Fair  Ambulation/Gait Training:  Distance (ft): 10 Feet (ft)  Assistive Device: Gait belt;Walker, rolling  Ambulation - Level of Assistance: Minimal assistance  Gait Description (WDL): Exceptions to WDL  Gait Abnormalities: Decreased step clearance  Base of Support: Narrowed  Speed/Tory: Slow  Step Length: Right shortened;Left shortened  Interventions: Safety awareness training; Tactile cues; Verbal cues; Visual/Demos  Pain:  Pain Scale 1: Numeric (0 - 10)  Pain Intensity 1: 0  Activity Tolerance:   Fair   Please refer to the flowsheet for vital signs taken during this treatment. After treatment:   []         Patient left in no apparent distress sitting up in chair  [x]         Patient left in no apparent distress in bed  [x]         Call bell left within reach  [x]         Nursing notified  [x]         Caregiver present  []         Bed alarm activated  []         SCDs applied    COMMUNICATION/EDUCATION:   [x]         Role of Physical Therapy in the acute care setting. []         Fall prevention education was provided and the patient/caregiver indicated understanding. []         Patient/family have participated as able in goal setting and plan of care. []         Patient/family agree to work toward stated goals and plan of care. []         Patient understands intent and goals of therapy, but is neutral about his/her participation.   [x]         Patient is unable to participate in goal setting/plan of care: ongoing with therapy staff.  []         Other:    Eval Complexity: History: HIGH Complexity :3+ comorbidities / personal factors will impact the outcome/ POC Exam:MEDIUM Complexity : 3 Standardized tests and measures addressing body structure, function, activity limitation and / or participation in recreation  Presentation: MEDIUM Complexity : Evolving with changing characteristics  Clinical Decision Making:Medium Complexity  Overall Complexity:MEDIUM    Thank you for this referral.  Desiree Fierro, PT   Time Calculation: 32 mins

## 2022-07-15 NOTE — ACP (ADVANCE CARE PLANNING)
Advance Care Planning     General Advance Care Planning (ACP) Conversation    Date of Conversation: 7/15/2022  Conducted with: Healthcare Decision Maker: Named in Advance Directive or Healthcare Power of [facility]:     Primary Decision Maker: Elise Dyson, [address]    Secondary Decision Maker: Yevgeniy Mc - Daughter - [phone]    Today we documented Decision Maker(s) consistent with ACP documents on file. Content/Action Overview: Has ACP document(s) on file - reflects the patient's care preferences  Reviewed DNR/DNI and husbnad elects Full Code (Attempt Resuscitation)    Length of Voluntary ACP Conversation in minutes:  <16 minutes (Non-Billable)    7/15/2022 1315 Seen today in room 330 along with Pat Settler, NP. Lying in bed being fed by personal aide, [employer]. Awake, alert. Oriented to person. Would follow some directions from [facility]. Respirations unlabored on room air. Came to the ED on 7/14/2022 from home per EMS for abdominal pain and possible UTI. Found to have UTI via labs. Admitted to telemetry for generalized weakness (PT assessment), diverticulitis  (SLP, surgery consultation, IV antibiotics), UTI (IV antibiotics, f/u urine culture), heart murmur (ECHO), HTN (home meidicaitons,t rend BPs),     PMH (from record review) significant for stroke, balance difficulties, HTN, dementai    The palliative care team has been consulted for code status discussion. Introduced the role of palliative medicine for the hospitalized patient to her  and Carolyn. Lives in a single family home with her . They have 7 day/week home nursing assistance. She has a hospital bed and an electric stair chair. Prior to admission,she was dependent in most ADLs. Uses a gait belt and rolling walker for ambulation assistance. Reviewed AMD on file naming her  and daughter, [facility], as her MPOAs.  Mr [address] said the document was accurate. He said he was called by Dr Raul Gay last night to review the medication list and they began discussing possible DNR. He did seem to confuse MPOA decisions and DNR. We were starting to talk about those issues when he received a phone call and he had to leave in order to meet a technician at his home. Code status remains FULL CODE    Disposition plan: anticipate she will return home with family and nursing support. Palliative care will continue to follow Lupillo Ordonez  and her family during her hospitalization and support them as they make healthcare decisions and define goals of care.       Yifan Escobar, RN, MSN  Palliative Medicine  P: [phone]

## 2022-07-15 NOTE — H&P
History & Physical    Patient: Becki Colin MRN: [id number]  [facility]: [id number]    YOB: 1927  Age: 80 y.o. Sex: female      DOA: 7/14/2022  Primary Care Provider:  Radha Beckford DO      Assessment/Plan     Patient Active Problem List   Diagnosis Code    UTI (urinary tract infection) N39.0    HTN (hypertension) I10    Dementia ([address] F03.90    History of hemorrhagic stroke with residual hemiplegia (HCC) I69.359    Diverticulosis of colon K57.30    Diverticulitis large intestine K57.32    Weakness generalized R53.1    Heart murmur R361     19-year-old female with hypertension and history of hemorrhagic stroke with residual weakness and dementia is admitted for generalized weakness with a UTI and diverticulitis of the large intestine in the setting of extensive colonic diverticulosis. Generalized weakness-at baseline she uses a gait belt to ambulate  -Physical therapy consult  -Has home health help at home    Diverticulitis of the large intestine with extensive colonic diverticulosis-no perforation or abscess seen on CT with benign clinical exam  --NPO overnight  - Speech evaluation for swallow study to check for aspiration given wet coughing  - Clear liquid diet to start after speech evaluation  -Zosyn for antibiotic treatment  -Antiemetics as needed  -General surgery consult just for the extent of the diverticulosis but doubt surgery would be recommended given her age    UTI  - Follow-up urine culture  -Zosyn has been started as above    Heart murmur-harsh systolic ejection murmur with pleural effusions on chest xray  --Echocardiogram to assess cardiac functioin    Hypertension  -Continue amlodipine daily    Dementia-is A&O x1 at baseline    History of hemorrhagic stroke with residual weakness    Full code-her  states that she has an advanced directive but she does not have a DNR. Right now, he does not want to make her DNR.   I will consult palliative care to discuss more in detail and I recommended that he consider this in light of her age and medical problems. Prophylaxis: Pepcid IV, Lovenox SQ    Estimated length of stay : 3 nights    MD Liang Lorenzanaist  ----------------------------------------------------------------------------------------------------------------------------------------------------------------------------------------------------------------  CC: Weakness, abdominal pain       HPI:     Aldo Portillo is a 80 y.o. female with hypertension and history of hemorrhagic stroke with residual weakness and dementia presents to the ED with her  and caregiver due to increasing weakness and difficulty ambulating today. She also had complained of abdominal pain. History is unobtainable from the patient due to dementia. Her  states that 2 nights ago she began developing abdominal pain; she had difficulty walking and getting to the bathroom even with a gait belt due to weakness. He was worried it may be a urinary tract infection so brought her in today. She has not had fever at home and has not been vomiting. He has not noticed her choking on her food. Past Medical History:   Diagnosis Date    Dementia ([facility] 7/15/2022    History of hemorrhagic stroke with residual hemiplegia ([facility] [address] 7/15/2022    Hypertension     Stroke [facility])     2014       Past Surgical History:   Procedure Laterality Date    HX CHOLECYSTECTOMY      HX HYSTERECTOMY         History reviewed. No pertinent family history. Unable to obtain due to dementia.     Social History     Socioeconomic History    Marital status:        Prior to Admission medications    Not on File       No Known Allergies    Review of Systems  Unable to obtain due to dementia    Physical Exam:     Physical Exam:  Visit Vitals  /79 (BP 1 Location: Right upper arm, BP Patient Position: At rest)   Pulse 88   Temp 98.4 °F (36.9 °C)   Resp 16   Wt 49 kg (108 lb)   SpO2 98%      O2 Device: None (Room air)    Temp (24hrs), Av.9 °F (36.6 °C), Min:97 °F (36.1 °C), Max:98.4 °F (36.9 °C)    [birth year]01 - 07/15 0700  In: 150 [I.V.:150]  Out: -    No intake/output data recorded. General:  Awake, no distress, A&Ox1, wet cough. Head:  Normocephalic, without obvious abnormality, atraumatic. Eyes:  Conjunctivae/corneas clear, sclera anicteric. Nose: Nares normal. No drainage or sinus tenderness. Throat: Lips, mucosa, and tongue normal.    Neck: Supple, symmetrical, trachea midline. Lungs:   Clear to auscultation bilaterally. Heart:  Regular rate and rhythm, S1, S2 normal, IV/VI harsh systolic murmur, no click, rub or gallop. Abdomen: Soft, no apparent tenderness with palpation, no rebound tenderness. Bowel sounds normal. No masses,  No organomegaly. Extremities: Extremities normal, atraumatic, no cyanosis or edema. Capillary refill normal.   Pulses: 2+ and symmetric all extremities. Skin: Skin color pink, turgor normal. No rashes or lesions   Neurologic: No focal motor or sensory deficit. Labs Reviewed: All lab results for the last 24 hours reviewed. Recent Results (from the past 24 hour(s))   CBC WITH AUTOMATED DIFF    Collection Time: 22  4:04 PM   Result Value Ref Range    WBC 8.3 4.6 - 13.2 K/uL    RBC 3.41 (L) 4.20 - 5.30 M/uL    HGB 9.8 (L) 12.0 - 16.0 g/dL    HCT 31.1 (L) 35.0 - 45.0 %    MCV 91.2 78.0 - 100.0 FL    MCH 28.7 24.0 - 34.0 PG    MCHC 31.5 31.0 - 37.0 g/dL    RDW 13.3 11.6 - 14.5 %    PLATELET 648 271 - 485 K/uL    MPV 11.5 9.2 - 11.8 FL    NRBC 0.0 0  WBC    ABSOLUTE NRBC 0.00 0.00 - 0.01 K/uL    NEUTROPHILS 78 (H) 40 - 73 %    LYMPHOCYTES 13 (L) 21 - 52 %    MONOCYTES 6 3 - 10 %    EOSINOPHILS 1 0 - 5 %    BASOPHILS 1 0 - 2 %    IMMATURE GRANULOCYTES 0 0.0 - 0.5 %    ABS. NEUTROPHILS 6.5 1.8 - 8.0 K/UL    ABS. LYMPHOCYTES 1.1 0.9 - 3.6 K/UL    ABS. MONOCYTES 0.5 0.05 - 1.2 K/UL    ABS. EOSINOPHILS 0.1 0.0 - 0.4 K/UL    ABS.  BASOPHILS 0.1 0.0 - 0.1 K/UL    ABS. IMM. GRANS. 0.0 0.00 - 0.04 K/UL    DF AUTOMATED     METABOLIC PANEL, BASIC    Collection Time: 07/14/22  4:04 PM   Result Value Ref Range    Sodium 139 136 - 145 mmol/L    Potassium 4.4 3.5 - 5.5 mmol/L    Chloride 106 100 - 111 mmol/L    CO2 29 21 - 32 mmol/L    Anion gap 4 3.0 - 18 mmol/L    Glucose 113 (H) 74 - 99 mg/dL    BUN 22 (H) 7.0 - 18 MG/DL    Creatinine 1.17 0.6 - 1.3 MG/DL    BUN/Creatinine ratio 19 12 - 20      GFR est AA 52 (L) >60 ml/min/1.73m2    GFR est non-AA 43 (L) >60 ml/min/1.73m2    Calcium 8.7 8.5 - 10.1 MG/DL   LIPASE    Collection Time: 07/14/22  4:04 PM   Result Value Ref Range    Lipase 105 73 - 393 U/L   HEPATIC FUNCTION PANEL    Collection Time: 07/14/22  4:04 PM   Result Value Ref Range    Protein, total 6.6 6.4 - 8.2 g/dL    Albumin 3.1 (L) 3.4 - 5.0 g/dL    Globulin 3.5 2.0 - 4.0 g/dL    A-G Ratio 0.9 0.8 - 1.7      Bilirubin, total 0.3 0.2 - 1.0 MG/DL    Bilirubin, direct 0.1 0.0 - 0.2 MG/DL    Alk.  phosphatase 88 45 - 117 U/L    AST (SGOT) 20 10 - 38 U/L    ALT (SGPT) 17 13 - 56 U/L   EKG, 12 LEAD, INITIAL    Collection Time: 07/14/22  5:17 PM   Result Value Ref Range    Ventricular Rate 92 BPM    Atrial Rate 92 BPM    P-R Interval 190 ms    QRS Duration 70 ms    Q-T Interval 340 ms    QTC Calculation (Bezet) 420 ms    Calculated P Axis 51 degrees    Calculated R Axis 9 degrees    Calculated T Axis 48 degrees    Diagnosis       Normal sinus rhythm  Normal ECG  No previous ECGs available     URINALYSIS W/ RFLX MICROSCOPIC    Collection Time: 07/14/22  5:30 PM   Result Value Ref Range    Color YELLOW      Appearance CLOUDY      Specific gravity 1.012 1.005 - 1.030      pH (UA) 7.5 5.0 - 8.0      Protein TRACE (A) NEG mg/dL    Glucose Negative NEG mg/dL    Ketone Negative NEG mg/dL    Bilirubin Negative NEG      Blood MODERATE (A) NEG      Urobilinogen 0.2 0.2 - 1.0 EU/dL    Nitrites Positive (A) NEG      Leukocyte Esterase LARGE (A) NEG     URINE MICROSCOPIC ONLY    Collection Time: 07/14/22  5:30 PM   Result Value Ref Range    WBC 21 to 35 0 - 5 /hpf    RBC 4 to 10 0 - 5 /hpf    Epithelial cells 2+ 0 - 5 /lpf    Bacteria 4+ (A) NEG /hpf   LACTIC ACID    Collection Time: 07/14/22 11:05 PM   Result Value Ref Range    Lactic acid 1.2 0.4 - 2.0 MMOL/L   METABOLIC PANEL, COMPREHENSIVE    Collection Time: 07/15/22 12:30 AM   Result Value Ref Range    Sodium 138 136 - 145 mmol/L    Potassium 4.0 3.5 - 5.5 mmol/L    Chloride 106 100 - 111 mmol/L    CO2 27 21 - 32 mmol/L    Anion gap 5 3.0 - 18 mmol/L    Glucose 107 (H) 74 - 99 mg/dL    BUN 19 (H) 7.0 - 18 MG/DL    Creatinine 1.01 0.6 - 1.3 MG/DL    BUN/Creatinine ratio 19 12 - 20      GFR est AA >60 >60 ml/min/1.73m2    GFR est non-AA 51 (L) >60 ml/min/1.73m2    Calcium 8.3 (L) 8.5 - 10.1 MG/DL    Bilirubin, total 0.4 0.2 - 1.0 MG/DL    ALT (SGPT) 17 13 - 56 U/L    AST (SGOT) 18 10 - 38 U/L    Alk.  phosphatase 83 45 - 117 U/L    Protein, total 6.4 6.4 - 8.2 g/dL    Albumin 3.1 (L) 3.4 - 5.0 g/dL    Globulin 3.3 2.0 - 4.0 g/dL    A-G Ratio 0.9 0.8 - 1.7     CBC W/O DIFF    Collection Time: 07/15/22 12:30 AM   Result Value Ref Range    WBC 9.4 4.6 - 13.2 K/uL    RBC 3.38 (L) 4.20 - 5.30 M/uL    HGB 10.0 (L) 12.0 - 16.0 g/dL    HCT 31.2 (L) 35.0 - 45.0 %    MCV 92.3 78.0 - 100.0 FL    MCH 29.6 24.0 - 34.0 PG    MCHC 32.1 31.0 - 37.0 g/dL    RDW 13.0 11.6 - 14.5 %    PLATELET 474 686 - 126 K/uL    MPV 11.6 9.2 - 11.8 FL    NRBC 0.0 0  WBC    ABSOLUTE NRBC 0.00 0.00 - 0.01 K/uL     Results  CT HEAD WO CONT (Accession [id number]) (Order [id number])    Allergies       No Known Allergies     Exam Information    Status Exam Begun  Exam Ended    Final [99] 7/14/2022 18:31 7/14/2022  6:52 PM [id number]  6:52 PM     Result Information    Status: Final result (Exam End: 7/14/2022 18:52) Provider Status: Open       CT HEAD WO CONT: Patient Communication     Released  Not seen     Study Result    Narrative & Impression   EXAM: CT head     CLINICAL INDICATION/HISTORY: Altered level of consciousness.     COMPARISON: None.     TECHNIQUE: Axial CT imaging of the head  was obtained from skull base to vertex  without intravenous contrast. Coronal and sagittal reconstructions were  obtained. One or more dose reduction techniques were used on this CT: automated  exposure control, adjustment of the mAs and/or kVp according to patient's size,  and iterative reconstruction techniques. The specific techniques utilized on  this CT exam have been documented in the patient's electronic medical record. Digital imaging and communications and medicine (DICOM) format image data are  available to nonaffiliated external healthcare facilities or entities on a  secure, media free, reciprocally searchable basis with patient authorization for  at least a 12 month period after this study.     _______________     FINDINGS:     BRAIN AND POSTERIOR FOSSA: Moderate diffuse central and cortical volume loss. There is no intracranial hemorrhage, mass effect, or shift of midline  structures. There is chronic appearing encephalomalacia and gliosis right  cerebellum and medial left cerebellum. Mild to moderate confluent and patchy  hypodensity bilateral cerebral white matter.     EXTRA-AXIAL SPACES AND MENINGES: There are no abnormal extra-axial fluid  collections.     CALVARIUM: Previous bilateral frontal craniotomy with no acute or aggressive  osseous finding.     SINUSES: The visualized portions of the paranasal sinuses and mastoid air cells  are well aerated.     OTHER: None.     _______________     IMPRESSION     1. No evidence of acute intracranial finding.     2. Chronic encephalomalacia/gliosis, possible sequela of chronic infarct  involving right cerebellum and medial left cerebellum. White matter hypodensity  likely chronic microvascular ischemic disease. Diffuse volume loss.        Results  CT ABD PELV WO CONT (Accession [id number]) (Order [id number])    Allergies       No Known Allergies     Exam Information    Status Exam Begun  Exam Ended    Final [99] 7/14/2022 18:31 7/14/2022  6:53 PM [id number]  6:53 PM     Result Information    Status: Final result (Exam End: 7/14/2022 18:53) Provider Status: Open       CT ABD PELV WO CONT: Patient Communication     Released  Not seen     Study Result    Narrative & Impression   EXAM:CT abdomen pelvis without contrast     CLINICAL INDICATION/HISTORY: Right lower quadrant and left-sided flank pain     COMPARISON: None.     TECHNIQUE: Standard helical CT performed through the abdomen and pelvis without  contrast.  Coronal and sagittal reformations were generated. One or more dose  reduction techniques were used on this CT: automated exposure control,  adjustment of the mAs and/or kVp according to patient's size, and iterative  reconstruction techniques. The specific techniques utilized on this CT exam have  been documented in the patient's electronic medical record. Digital imaging and  communications and medicine (DICOM) format image data are available to  nonaffiliated external healthcare facilities or entities on a secure, media  free, reciprocally searchable basis with patient authorization for at least a 12  month period after this study.        _______________     FINDINGS:     INFERIOR THORAX: Minimal bibasilar atelectasis with minimal bilateral pleural  effusions. Moderate diffuse cardiac enlargement.     LIVER/BILIARY: No suspicious liver lesion. No biliary dilation. Cholecystectomy.     SPLEEN: Normal.     PANCREAS: Normal.     ADRENALS: Normal.     KIDNEYS: Mild bilateral renal atrophy and mild nonspecific bilateral perirenal  stranding with no evidence of urinary system calculus or obstruction or renal  mass.  Bladder unremarkable.     LYMPH NODES: No technically enlarged nodes.     GI TRACT: Numerous colonic diverticuli with some abnormal pericolonic stranding  splenic flexure without definite wall thickening. No abnormal extraluminal fluid  collection or abnormal intraperitoneal air. No abnormal small or large bowel  wall thickening or dilatation. Appendix not visualized with no abnormal  inflammation surrounding cecum.     VASCULATURE: Severe aortoiliac atherosclerotic calcification without evidence of  aneurysm.     PELVIC ORGANS: Hysterectomy with no abnormal mass.     OTHER: Mild superior endplate concave deformity L4 vertebral body with mild loss  of height, probably chronic but age not definitely determined. Mild scoliotic  curvature convex to the left upper lumbar spine. No other acute or aggressive  osseous findings.     _______________     IMPRESSION     1. Extensive colonic diverticulosis with nonspecific abnormal stranding  surrounding splenic flexure without definite colonic wall thickening, findings  may still be related to acute diverticulitis. No evidence of abscess or free  air.     2. No other acute abdominal or pelvic CT finding.     3. Minimal bibasilar atelectasis and small pleural effusions.     4. Mild superior endplate fracture with mild loss of height L4 vertebral body  probably chronic but age not definitely determined.

## 2022-07-15 NOTE — PROGRESS NOTES
Reviewed chart. abd pain  Urinary tract infection by UA  CT shows stranding in splenic flexure without other concerning findings or specific signs of diverticulitis. WBC normal, Cr 1.17    No clear diverticulitis. Splenic flexure is classic watershed area in ischemic colitis. Pt may be dehydrated as seen by Cr 1.17 and is contributing to CT findings. Recommend hydration and treating UTI. Will follow and continue to monitor.

## 2022-07-15 NOTE — PROGRESS NOTES
Pharmacy Dosing Services:  Enoxaparin    Previous Regimen Lovenox 40 mg sq q24hrs   Serum Creatinine Lab Results   Component Value Date/Time    Creatinine 1.01 07/15/2022 12:30 AM      Creatinine Clearance Estimated Creatinine Clearance: 25.8 mL/min (based on SCr of 1.01 mg/dL). BUN Lab Results   Component Value Date/Time    BUN 19 (H) 07/15/2022 12:30 AM         Dose administration notes:   Changed to Lovenox 30 mg sq q24hrs per renal dosing protocol    Plan:  Continue to monitor     Lacho Thorpe

## 2022-07-15 NOTE — PROGRESS NOTES
Patient was talking to herself.   prayed for her and Left Material.    Sister Gurpreet Jain, [place], [address]  [phone]

## 2022-07-15 NOTE — PROGRESS NOTES
Speech-Language Pathology Evaluation Attempt     Chart reviewed. Attempted Speech-Language Pathology Evaluation/Treatment, however, patient unable to be seen due to:  []  Nausea/vomiting  []  Eating  []  Pain  []  Patient too lethargic  [x]  Off Unit for testing/procedure  []  Dialysis treatment in progress   []  Telemetry Results  [x]  Other:Patient minimally awakening, refusing PO at this time      Will f/u later as patient's schedule allows. Thank you for this referral.  LONNIE Coon.Ed, [id number] [facility]

## 2022-07-15 NOTE — CONSULTS
Palliative Medicine Consult    Patient Name: Austin Gordon  YOB: 1927    Date of Initial Consult: 7/15/2022  Reason for Consult: Goals of care discussions  Requesting Provider: Dr. Erin Corona   Primary Care Physician: Ag Burris DO      SUMMARY:   Austin Gordon is a 80 y. o. with a past history of HTN, hemorrhagic stroke with residual weakness, and dementia, who was admitted on 7/14/2022 from home with a diagnosis of UTI and diverticulitis of the large intestine with extensive colonic diverticulosis. Current medical issues leading to Palliative Medicine involvement include: Goals of care discussions. PALLIATIVE DIAGNOSES:   1. Goals of care discussions  2. UTI  3. Diverticulosis  4. Dementia/advanced age/debility       PLAN:   1. Goals of care discussions: Palliative medicine team including Juvenal Lindsay RN and I met with patient at patient's bedside. Patient is awake, alert, and oriented to self. She is pleasantly demented, she will talk, but will not answer questions correctly. Her personal nursing aide Isabel Otto is at bedside feeding patient lunch. Patient's  Yahir Banda at bedside. Introduced role as palliative medicine. We were able to obtain a copy of patient's AMD, placed on chart for review. Patient's  is primary medical power of , patient's daughter Steven Markham is secondary medical power of . Patient's  states \"I was asked to consider changing the AMD to a DNR status, but I need time to think about that\". Initiated goals of care discussions, and started to talk about CPR. Our conversation ended abruptly because  got a phone call that a  was on the way to his house to repair a stair lift chair. Patient's  apologizes but had to leave. He agrees to have further goals of care discussions while patient is hospitalized.   At this time patient remains a full code with full interventions. 2. UTI: on antibiotics per primary team  3. Diverticulosis: GI following, per GI no specific signs of diverticulitis, with recommendations for hydration and treating UTI. 4. Dementia/advanced age/debility: 70-year-old female who lives at home with her . She has 7-day a week private caregiver support. Patient requires assist with all ADLs, and requires multiple redirection with feeding. Patient has history of dementia, she is verbal, however not engaged in conversation, and she is unable to answer questions appropriately. Prior to admission, patient was able to ambulate to the bathroom with nursing assistance using a gait belt and a rolling walker. 5. Initial consult note routed to primary continuity provider  6. Communicated plan of care with: Palliative IDT       GOALS OF CARE / TREATMENT PREFERENCES:   [====Goals of Care====]  GOALS OF CARE: Full code with full interventions  Patient/Health Care Proxy Stated Goals: Prolong life      TREATMENT PREFERENCES:   Code Status: Full Code    Advance Care Planning:  No flowsheet data found. Medical Interventions: Full interventions           The palliative care team has discussed with patient / health care proxy about goals of care / treatment preferences for patient.  [====Goals of Care====]         HISTORY:     History obtained from: patient's , chart    CHIEF COMPLAINT: weakness, UTI    HPI/SUBJECTIVE:    The patient is:   [] Verbal and participatory  [x] Non-participatory due to:    pleasantly demented, not able to participate in conversations    Clinical Pain Assessment (nonverbal scale for severity on nonverbal patients):   Clinical Pain Assessment  Severity: 0    Adult Nonverbal Pain Scale  Face: No particular expression or smile  Activity (Movement): Lying quietly, normal position  Guarding: Lying quietly, no positioning of hands over areas of body  Physiology (Vital Signs):  Stable vital signs  Respiratory: Baseline RR/SpO2 compliant with ventilator  Total Score: 0       FUNCTIONAL ASSESSMENT:     Palliative Performance Scale (PPS):  PPS: 40       PSYCHOSOCIAL/SPIRITUAL SCREENING:     Advance Care Planning:  No flowsheet data found. Any spiritual / Muslim concerns: unable to assess  [] Yes /  [] No    Caregiver Burnout:  [] Yes /  [x] No /  [] No Caregiver Present      Anticipatory grief assessment: unable to assess  [] Normal  / [] Maladaptive          REVIEW OF SYSTEMS:     Positive and pertinent negative findings in ROS are noted above in HPI. The following systems were [] reviewed / [x] unable to be reviewed as noted in HPI  Other findings are noted below. Systems: constitutional, ears/nose/mouth/throat, respiratory, gastrointestinal, genitourinary, musculoskeletal, integumentary, neurologic, psychiatric, endocrine. Positive findings noted below. Modified ESAS Completed by: provider           Pain: 0           Dyspnea: 0                    PHYSICAL EXAM:     From RN flowsheet:  Wt Readings from Last 3 Encounters:   07/15/22 49 kg (108 lb)     Blood pressure (!) 147/84, pulse 89, temperature 98.8 °F (37.1 °C), resp. rate 17, height 5' 3\" (1.6 m), weight 49 kg (108 lb), SpO2 97 %.     Pain Scale 1: Numeric (0 - 10)  Pain Intensity 1: 0                   Constitutional: Awake, alert, sitting up in bed eating breakfast, appears in NAD, elderly, frail  Eyes: pupils equal, anicteric  ENMT: no nasal discharge, moist mucous membranes  Cardiovascular: regular rhythm, distal pulses intact  Respiratory: breathing not labored, symmetric  Gastrointestinal: soft non-tender   Musculoskeletal: no deformity, no tenderness to palpation  Skin: warm, dry  Neurologic: following commands, moving all extremities, oriented to self, will talk but doesn't answer questions       HISTORY:     Principal Problem:    Weakness generalized (7/15/2022)    Active Problems:    UTI (urinary tract infection) (7/14/2022)      HTN (hypertension) (7/15/2022) Dementia ([facility] [address] (7/15/2022)      History of hemorrhagic stroke with residual hemiplegia ([facility] [address] (7/15/2022)      Diverticulosis of colon (7/15/2022)      Diverticulitis large intestine (7/15/2022)      Heart murmur (7/15/2022)      Past Medical History:   Diagnosis Date    Dementia ([facility] [address] 7/15/2022    [facility] of [facility] 7/15/2022    Hypertension     Stroke [facility])     2014      Past Surgical History:   Procedure Laterality Date    HX CHOLECYSTECTOMY      HX HYSTERECTOMY        History reviewed. No pertinent family history. History reviewed, no pertinent family history.   Social History     Tobacco Use    Smoking status: Not on file    Smokeless tobacco: Not on file   Substance Use Topics    Alcohol use: Not on file     No Known Allergies   Current Facility-Administered Medications   Medication Dose Route Frequency    amLODIPine (NORVASC) tablet 10 mg  10 mg Oral DAILY    PARoxetine (PAXIL) tablet 10 mg  10 mg Oral DAILY    rosuvastatin (CRESTOR) tablet 10 mg  10 mg Oral QHS    cholecalciferol (VITAMIN D3) (1000 Units /25 mcg) tablet 2,000 Units  2,000 Units Oral DAILY    cyanocobalamin tablet 1,000 mcg  1,000 mcg Oral DAILY    sodium chloride (NS) flush 5-40 mL  5-40 mL IntraVENous Q8H    sodium chloride (NS) flush 5-40 mL  5-40 mL IntraVENous PRN    acetaminophen (TYLENOL) tablet 650 mg  650 mg Oral Q6H PRN    Or    acetaminophen (TYLENOL) suppository 650 mg  650 mg Rectal Q6H PRN    polyethylene glycol (MIRALAX) packet 17 g  17 g Oral DAILY PRN    ondansetron (ZOFRAN ODT) tablet 4 mg  4 mg Oral Q8H PRN    Or    ondansetron (ZOFRAN) injection 4 mg  4 mg IntraVENous Q6H PRN    enoxaparin (LOVENOX) injection 40 mg  40 mg SubCUTAneous DAILY    famotidine (PF) (PEPCID) 20 mg in 0.9% sodium chloride 10 mL injection  20 mg IntraVENous BID    0.9% sodium chloride infusion  100 mL/hr IntraVENous CONTINUOUS    piperacillin-tazobactam (ZOSYN) 3.375 g in 0.9% sodium chloride (MBP/ADV) 100 mL MBP  3.375 g IntraVENous Q6H          LAB AND IMAGING FINDINGS:     Lab Results   Component Value Date/Time    WBC 9.4 07/15/2022 12:30 AM    HGB 10.0 (L) 07/15/2022 12:30 AM    PLATELET 647 83/06/6105 12:30 AM     Lab Results   Component Value Date/Time    Sodium 138 07/15/2022 12:30 AM    Potassium 4.0 07/15/2022 12:30 AM    Chloride 106 07/15/2022 12:30 AM    CO2 27 07/15/2022 12:30 AM    BUN 19 (H) 07/15/2022 12:30 AM    Creatinine 1.01 07/15/2022 12:30 AM    Calcium 8.3 (L) 07/15/2022 12:30 AM      Lab Results   Component Value Date/Time    Alk. phosphatase 83 07/15/2022 12:30 AM    Protein, total 6.4 07/15/2022 12:30 AM    Albumin 3.1 (L) 07/15/2022 12:30 AM    Globulin 3.3 07/15/2022 12:30 AM     No results found for: INR, PTMR, PTP, PT1, PT2, APTT, INREXT, INREXT   No results found for: IRON, FE, TIBC, IBCT, PSAT, FERR   No results found for: PH, PCO2, PO2  No components found for: GLPOC   No results found for: CPK, CKMB             Total time: 30 minutes  Counseling / coordination time, spent as noted above:   > 50% counseling / coordination?: yes, patient, family, and medical team    Prolonged service was provided for  []30 min   []75 min in face to face time in the presence of the patient, spent as noted above.   Time Start:   Time End:

## 2022-07-15 NOTE — PROGRESS NOTES
Problem: Pressure Injury - Risk of  Goal: *Prevention of pressure injury  Description: Document Luca Scale and appropriate interventions in the flowsheet.   Outcome: Progressing Towards Goal  Note: Pressure Injury Interventions:       Moisture Interventions: Absorbent underpads,Apply protective barrier, creams and emollients                                    Problem: Patient Education: Go to Patient Education Activity  Goal: Patient/Family Education  Outcome: Progressing Towards Goal

## 2022-07-16 LAB
ALBUMIN SERPL-MCNC: 2.9 G/DL (ref 3.4–5)
ALBUMIN/GLOB SERPL: 0.9 {RATIO} (ref 0.8–1.7)
ALP SERPL-CCNC: 80 U/L (ref 45–117)
ALT SERPL-CCNC: 15 U/L (ref 13–56)
ANION GAP SERPL CALC-SCNC: 5 MMOL/L (ref 3–18)
AST SERPL-CCNC: 19 U/L (ref 10–38)
BILIRUB SERPL-MCNC: 0.6 MG/DL (ref 0.2–1)
BUN SERPL-MCNC: 12 MG/DL (ref 7–18)
BUN/CREAT SERPL: 12 (ref 12–20)
CALCIUM SERPL-MCNC: 8.5 MG/DL (ref 8.5–10.1)
CHLORIDE SERPL-SCNC: 107 MMOL/L (ref 100–111)
CO2 SERPL-SCNC: 27 MMOL/L (ref 21–32)
CREAT SERPL-MCNC: 0.97 MG/DL (ref 0.6–1.3)
ERYTHROCYTE [DISTWIDTH] IN BLOOD BY AUTOMATED COUNT: 13.2 % (ref 11.6–14.5)
GLOBULIN SER CALC-MCNC: 3.3 G/DL (ref 2–4)
GLUCOSE SERPL-MCNC: 78 MG/DL (ref 74–99)
HCT VFR BLD AUTO: 30.1 % (ref 35–45)
HGB BLD-MCNC: 9.7 G/DL (ref 12–16)
MCH RBC QN AUTO: 29.1 PG (ref 24–34)
MCHC RBC AUTO-ENTMCNC: 32.2 G/DL (ref 31–37)
MCV RBC AUTO: 90.4 FL (ref 78–100)
NRBC # BLD: 0 K/UL (ref 0–0.01)
NRBC BLD-RTO: 0 PER 100 WBC
PLATELET # BLD AUTO: 223 K/UL (ref 135–420)
PMV BLD AUTO: 11.9 FL (ref 9.2–11.8)
POTASSIUM SERPL-SCNC: 3.3 MMOL/L (ref 3.5–5.5)
PROT SERPL-MCNC: 6.2 G/DL (ref 6.4–8.2)
RBC # BLD AUTO: 3.33 M/UL (ref 4.2–5.3)
SODIUM SERPL-SCNC: 139 MMOL/L (ref 136–145)
WBC # BLD AUTO: 8.2 K/UL (ref 4.6–13.2)

## 2022-07-16 PROCEDURE — 74011250636 HC RX REV CODE- 250/636: Performed by: FAMILY MEDICINE

## 2022-07-16 PROCEDURE — 74011250637 HC RX REV CODE- 250/637: Performed by: INTERNAL MEDICINE

## 2022-07-16 PROCEDURE — 80053 COMPREHEN METABOLIC PANEL: CPT

## 2022-07-16 PROCEDURE — 74011000258 HC RX REV CODE- 258: Performed by: FAMILY MEDICINE

## 2022-07-16 PROCEDURE — 92610 EVALUATE SWALLOWING FUNCTION: CPT

## 2022-07-16 PROCEDURE — 92526 ORAL FUNCTION THERAPY: CPT

## 2022-07-16 PROCEDURE — 36415 COLL VENOUS BLD VENIPUNCTURE: CPT

## 2022-07-16 PROCEDURE — 74011250637 HC RX REV CODE- 250/637: Performed by: FAMILY MEDICINE

## 2022-07-16 PROCEDURE — 85027 COMPLETE CBC AUTOMATED: CPT

## 2022-07-16 PROCEDURE — 65270000029 HC RM PRIVATE

## 2022-07-16 PROCEDURE — 74011000250 HC RX REV CODE- 250: Performed by: FAMILY MEDICINE

## 2022-07-16 RX ORDER — FAMOTIDINE 20 MG/1
20 TABLET, FILM COATED ORAL 2 TIMES DAILY
Status: DISCONTINUED | OUTPATIENT
Start: 2022-07-16 | End: 2022-07-18 | Stop reason: DRUGHIGH

## 2022-07-16 RX ORDER — POTASSIUM CHLORIDE 20 MEQ/1
40 TABLET, EXTENDED RELEASE ORAL
Status: COMPLETED | OUTPATIENT
Start: 2022-07-16 | End: 2022-07-16

## 2022-07-16 RX ORDER — ACETAMINOPHEN 325 MG/1
650 TABLET ORAL 3 TIMES DAILY
Status: DISCONTINUED | OUTPATIENT
Start: 2022-07-16 | End: 2022-07-18 | Stop reason: HOSPADM

## 2022-07-16 RX ADMIN — POTASSIUM CHLORIDE 40 MEQ: 20 TABLET, EXTENDED RELEASE ORAL at 09:49

## 2022-07-16 RX ADMIN — ENOXAPARIN SODIUM 30 MG: 100 INJECTION SUBCUTANEOUS at 09:27

## 2022-07-16 RX ADMIN — PIPERACILLIN AND TAZOBACTAM 3.38 G: 3; .375 INJECTION, POWDER, LYOPHILIZED, FOR SOLUTION INTRAVENOUS at 04:23

## 2022-07-16 RX ADMIN — PAROXETINE HYDROCHLORIDE 10 MG: 10 TABLET, FILM COATED ORAL at 09:27

## 2022-07-16 RX ADMIN — PIPERACILLIN AND TAZOBACTAM 3.38 G: 3; .375 INJECTION, POWDER, LYOPHILIZED, FOR SOLUTION INTRAVENOUS at 09:46

## 2022-07-16 RX ADMIN — ROSUVASTATIN CALCIUM 10 MG: 10 TABLET, FILM COATED ORAL at 22:20

## 2022-07-16 RX ADMIN — CYANOCOBALAMIN TAB 1000 MCG 1000 MCG: 1000 TAB at 09:27

## 2022-07-16 RX ADMIN — Medication 2000 UNITS: at 09:26

## 2022-07-16 RX ADMIN — FAMOTIDINE 20 MG: 20 TABLET, FILM COATED ORAL at 21:20

## 2022-07-16 RX ADMIN — ACETAMINOPHEN 650 MG: 325 TABLET ORAL at 17:20

## 2022-07-16 RX ADMIN — SODIUM CHLORIDE, PRESERVATIVE FREE 10 ML: 5 INJECTION INTRAVENOUS at 22:51

## 2022-07-16 RX ADMIN — PIPERACILLIN AND TAZOBACTAM 2.25 G: 2; .25 INJECTION, POWDER, LYOPHILIZED, FOR SOLUTION INTRAVENOUS at 22:21

## 2022-07-16 RX ADMIN — SODIUM CHLORIDE, PRESERVATIVE FREE 10 ML: 5 INJECTION INTRAVENOUS at 14:00

## 2022-07-16 RX ADMIN — AMLODIPINE BESYLATE 10 MG: 5 TABLET ORAL at 09:26

## 2022-07-16 RX ADMIN — ACETAMINOPHEN 650 MG: 325 TABLET ORAL at 22:20

## 2022-07-16 RX ADMIN — FAMOTIDINE 20 MG: 10 INJECTION, SOLUTION INTRAVENOUS at 09:27

## 2022-07-16 NOTE — PROGRESS NOTES
Patient slept through the night. Denied pain or discomfort. 0720 -Bedside and Verbal shift change report given to KAYLYNN Hawthorne RN (oncoming nurse) by Eligio Oglesby (offgoing nurse). Report included the following information SBAR, Kardex, Procedure Summary and Intake/Output.

## 2022-07-16 NOTE — PROGRESS NOTES
Pharmacy Dosing Services: Zosyn    Indication: Intra abdominal  Previous Regimen Zosyn 3.375g IV Q 6 hours   Serum Creatinine Lab Results   Component Value Date/Time    Creatinine 0.97 07/16/2022 05:25 AM      Creatinine Clearance Estimated Creatinine Clearance: 26.8 mL/min (based on SCr of 0.97 mg/dL).    BUN Lab Results   Component Value Date/Time    BUN 12 07/16/2022 05:25 AM       Dose adjusted based on Zosyn 2.25g IV Q 6 hours   Plan:  Continue to monitor

## 2022-07-16 NOTE — PROGRESS NOTES
Hospitalist Progress Note    Patient: Johanna Cardenas MRN: [id number]  CSN: [id number]    YOB: 1927  Age: 80 y.o. Sex: female    DOA: 7/14/2022 LOS:  LOS: 2 days            Assessment/Plan     1. UTI  2. Possible diverticulitis  3. Dehydration  4. Hypokalemia  5. Dementia  6. Mitral stenosis, mild to moderate, appears asymptomatic        Plan:  - continue IV zosyn  - await culture data  - change IV pepcid to po  - advance diet  - start scheduled tylenol  - replete K  - full code, dvt ppx lovenox    Patient Active Problem List   Diagnosis Code    UTI (urinary tract infection) N39.0    HTN (hypertension) I10    Dementia ([address] F03.90    History of hemorrhagic stroke with residual hemiplegia (HCC) I69.359    Diverticulosis of colon K57.30    Diverticulitis large intestine K57.32    Weakness generalized R53.1    Heart murmur R01.1               Subjective:    cc: UTI  Pt pleasantly confused  Participating in PT  Tolerates liquid diet      REVIEW OF SYSTEMS:  Unable to obtain 2/2 dementia    Objective:        Vital signs/Intake and Output:  Visit Vitals  BP (!) 158/74   Pulse 86   Temp 97.9 °F (36.6 °C)   Resp 18   Ht 5' 3\" (1.6 m)   Wt 49 kg (108 lb)   SpO2 99%   BMI 19.13 kg/m²     Current Shift:  No intake/output data recorded. Last three shifts:  07/14 1901 - 07/16 0700  In: 370 [P.O.:120; I.V.:250]  Out: 1600 [Urine:1600]    Body mass index is 19.13 kg/m².     Physical Exam:  GEN: thin, pleasantly confused  EYES: conjunctiva normal, lids with out lesions  HEENT: MMM, No thyromegaly, no lymphadenopathy  HEART: RRR  + systolic murmur+S1 +S2, no JVD, pulses 2+ distally  LUNGS: CTA B/L no wheezes, rales or rhonchi  ABDOMEN: + BS, soft NT/ND no organomegaly,  No rebound  EXTREMITIES: No edema cyanosis, cap refill normal   SKIN: no rashes or skin breakdown, no nodules, normal turgor  Current Facility-Administered Medications   Medication Dose Route Frequency    acetaminophen (TYLENOL) tablet 650 mg  650 mg Oral TID    famotidine (PEPCID) tablet 20 mg  20 mg Oral BID    amLODIPine (NORVASC) tablet 10 mg  10 mg Oral DAILY    PARoxetine (PAXIL) tablet 10 mg  10 mg Oral DAILY    rosuvastatin (CRESTOR) tablet 10 mg  10 mg Oral QHS    cholecalciferol (VITAMIN D3) (1000 Units /25 mcg) tablet 2,000 Units  2,000 Units Oral DAILY    cyanocobalamin tablet 1,000 mcg  1,000 mcg Oral DAILY    enoxaparin (LOVENOX) injection 30 mg  30 mg SubCUTAneous DAILY    sodium chloride (NS) flush 5-40 mL  5-40 mL IntraVENous Q8H    sodium chloride (NS) flush 5-40 mL  5-40 mL IntraVENous PRN    acetaminophen (TYLENOL) tablet 650 mg  650 mg Oral Q6H PRN    Or    acetaminophen (TYLENOL) suppository 650 mg  650 mg Rectal Q6H PRN    polyethylene glycol (MIRALAX) packet 17 g  17 g Oral DAILY PRN    ondansetron (ZOFRAN ODT) tablet 4 mg  4 mg Oral Q8H PRN    Or    ondansetron (ZOFRAN) injection 4 mg  4 mg IntraVENous Q6H PRN    0.9% sodium chloride infusion  100 mL/hr IntraVENous CONTINUOUS    piperacillin-tazobactam (ZOSYN) 3.375 g in 0.9% sodium chloride (MBP/ADV) 100 mL MBP  3.375 g IntraVENous Q6H         All the patient's labs over the past 24 hours were reviewed both during my initial daily workflow process and at the time notated as \"note time\" in [facility]. (It is not time stamped separately in this workflow.)  Select labs are listed below.         Labs: Results:       Chemistry Recent Labs     07/16/22  0525 07/15/22  0030 07/14/22  1604   GLU 78 107* 113*    138 139   K 3.3* 4.0 4.4    106 106   CO2 27 27 29   BUN 12 19* 22*   CREA 0.97 1.01 1.17   CA 8.5 8.3* 8.7   AGAP 5 5 4   BUCR 12 19 19   AP 80 83 88   TP 6.2* 6.4 6.6   ALB 2.9* 3.1* 3.1*   GLOB 3.3 3.3 3.5   AGRAT 0.9 0.9 0.9      CBC w/Diff Recent Labs     07/16/22  0525 07/15/22  0030 07/14/22  1604   WBC 8.2 9.4 8.3   RBC 3.33* 3.38* 3.41*   HGB 9.7* 10.0* 9.8*   HCT 30.1* 31.2* 31.1*    198 222 GRANS  --   --  78*   LYMPH  --   --  13*   EOS  --   --  1                      Liver Enzymes Recent Labs     07/16/22  0525   TP 6.2*   ALB 2.9*   AP 80          Procedures/imaging: see electronic medical records for all procedures/Xrays and details which were not copied into this note but were reviewed prior to creation of [address], DO  Internal Medicine/Geriatrics

## 2022-07-16 NOTE — PROGRESS NOTES
Problem: Dysphagia (Adult)  Goal: *Acute Goals and Plan of Care (Insert Text)  Description: Patient will:  1. Tolerate PO trials with 0 s/s overt distress in 4/5 trials  2. Utilize compensatory swallow strategies/maneuvers (decrease bite/sip, size/rate, alt. liq/sol) with min cues in 4/5 trials  3. Perform oral-motor/laryngeal exercises to increase oropharyngeal swallow function with min cues  4. Complete an objective swallow study (i.e., MBSS) to assess swallow integrity, r/o aspiration, and determine of safest LRD, min A  5. Pt will utilize comp strategies to improve respiration to swallow coordination to reduce aspiration risk and improve activity tolerance for sustained nutrition/ hydration given min-mod cues. Rec:   Currently: Clear thin liquids **okay to advance to soft/bite size with thin liquids, per MD discretion**  Aspiration precautions  HOB >45 during po intake, remain >30 for 30-45 minutes after po   Small bites/sips; alternate liquid/solid with slow feeding rate   Oral care TID  Meds crushed in puree    Outcome: Progressing Towards Goal     SPEECH LANGUAGE PATHOLOGY BEDSIDE SWALLOW   EVALUATION & TREATMENT     Patient: Amelia Strickland (23 y.o. female)  Date: 7/16/2022  Primary Diagnosis: UTI (urinary tract infection) [N39.0]  Diverticulitis [K57.92]        Precautions: Aspiration; Fall  PLOF: Per H&P    ASSESSMENT :  Based on the objective data described below, the patient presents with mild-moderate oral and suspected mild pharyngeal dysphagia. Today, SLP conducted a Clinical Beside Swallow Evaluation, per MD orders. Pt A&Ox self and pleasantly confused. Oral mech examination revealed structures functional for speech and deglutition. Pt observed with thin liquids +/- straw via single sips with mildly delayed wallow initiation, adequate laryngeal elevation to palpation and no overt s/sx aspiration. Pt demo'd acceptance of puree with functional oral prep.  Pt demo'd labored mastication with suckling of solids, but with thorough oral clearance  and s/sx aspiration witnessed. At bedside with RN during med pass in which pt appeared to tolerate well in puree. SLP RECS: Okay to advance to Soft/ bite sized with thin liquids per MD discretion- will leave on clear thin liquids at this time  -meds crushed in puree  -oral care TID   -standard aspiration precautions   ST will continue to follow. TREATMENT :  Skilled therapy initiated; SLP assisted pt with breakfast meal. Pt c/o'ing it doesn't taste good. Pt specifically fond of applesauce and apple juice. Educated pt on aspiration precautions and importance of compensatory swallow techniques to decrease aspiration risk (decrease rate of intake & sip/bite size, upright @HOB for all po intake and ~30 minutes after po); no apparent comprehension. SLP to follow as indicated. Patient will benefit from skilled intervention to address the above impairments. Patient's rehabilitation potential is considered to be Fair  Factors which may influence rehabilitation potential include:   []            None noted  [x]            Mental ability/status  [x]            Medical condition    [x]          Home/family situation and support systems  []            Safety awareness  []            Pain tolerance/management  []            Other:      PLAN :  Recommendations and Planned Interventions:  See above  Frequency/Duration: Patient will be followed by speech-language pathology 1-2 times per day/3-5 days per week to address goals. Discharge Recommendations: To Be Determined     SUBJECTIVE:   Patient stated it doesn't taste good\".     OBJECTIVE:     Past Medical History:   Diagnosis Date    Dementia ([facility] [address] 7/15/2022    History of hemorrhagic stroke with residual hemiplegia ([facility] [address] 7/15/2022    Hypertension     Stroke ([facility] [address]     2014     Past Surgical History:   Procedure Laterality Date    HX CHOLECYSTECTOMY      HX HYSTERECTOMY       Home Situation:   Home Situation  Home Environment: Private residence  # Steps to Enter: 0  One/Two Story Residence:  (split level with chair lift)  Living Alone: No  Support Systems: Spouse/Significant Other,Caregiver/Home Care Staff  Patient Expects to be Discharged to[de-identified] Home  Current DME Used/Available at Home: Wheelchair,Walker, rolling,Hospital bed  Tub or Shower Type: Other (comment) (sponge baths )    Cognitive and Communication Status:  Neurologic State: Alert,Confused  Orientation Level: Oriented to person  Cognition: Impaired decision making,Memory loss  Perception: Appears intact  Perseveration: Perseverates during conversation  Safety/Judgement: Decreased insight into deficits,Fall prevention  Oral Assessment:  Oral Assessment  Labial: No impairment  Dentition: Natural  Oral Hygiene: fair  Lingual: No impairment  Velum: No impairment  Mandible: Restricted  Gag Reflex: No impairment  P.O. Trials:  Patient Position: 70  Vocal quality prior to P.O.: No impairment  Consistency Presented: Puree; Solid; Thin liquid  How Presented: SLP-fed/presented     Bolus Acceptance: No impairment  Bolus Formation/Control: Impaired  Type of Impairment: Mastication  Propulsion: No impairment  Oral Residue: None  Initiation of Swallow: Delayed (# of seconds)  Laryngeal Elevation: Functional  Aspiration Signs/Symptoms: None  Pharyngeal Phase Characteristics: Audible swallow;Easily fatigued   Effective Modifications: None  Cues for Modifications: None       Oral Phase Severity: Mild-moderate  Pharyngeal Phase Severity : Mild    PAIN:  Pain level pre-treatment: unable to rate- confirmed stomach pain/10   Pain level post-treatment: unable to rate/10   Pain Intervention(s): Medication (see MAR);  Rest, Ice, Repositioning   Response to intervention: Nurse notified, See doc flow    After treatment:   []            Patient left in no apparent distress sitting up in chair  [x]            Patient left in no apparent distress in bed  [x]            Call bell left within reach  [x]            Nursing notified  []            Family present  []            Caregiver present  []            Bed alarm activated    COMMUNICATION/EDUCATION:   [x]            Aspiration precautions; swallow safety; compensatory techniques. []            Patient/family have participated as able in goal setting and plan of care. []            Patient/family agree to work toward stated goals and plan of care. []            Patient understands intent and goals of therapy; neutral about participation. [x]            Patient unable to participate in goal setting/plan of care; educ ongoing with interdisciplinary staff  [x]         Posted safety precautions in patient's room.     Thank you for this referral.  Kori Escobar, SLP  MA, CCC-SLP  Speech-Language Pathologist    Time Calculation: 23 mins  Evaluation Time: 10 minutes   Treatment Time: 13 minutes

## 2022-07-16 NOTE — PROGRESS NOTES
Reason for Admission:  Weakness, abdominal pain                     RUR Score:  Low; 11%                   Plan for utilizing home health:  Unlikely         PCP: First and Last name:  Thao Zacarias DO     Name of Practice:    Are you a current patient: Yes/No:    Approximate date of last visit:    Can you participate in a virtual visit with your PCP:                     Current Advanced Directive/Advance Care Plan: Full Code      Healthcare Decision Maker:   Click here to complete [address] including selection of the [address] Relationship (ie \"Primary\")             Primary Decision Maker: [facility] [employer], [id number] [place] [address] - [phone]    Secondary Decision Maker: Stephani Nixon - Ramandeep - [phone]                  Transition of Care Plan:   Home with private duty nurses and family support                   Chart reviewed. Per H&P Marcy Jauregui is a 80 y.o. female with hypertension and history of hemorrhagic stroke with residual weakness and dementia presents to the ED with her  and caregiver due to increasing weakness and difficulty ambulating today. She also had complained of abdominal pain. History is unobtainable from the patient due to dementia. Her  states that 2 nights ago she began developing abdominal pain; she had difficulty walking and getting to the bathroom even with a gait belt due to weakness. He was worried it may be a urinary tract infection so brought her in today. She has not had fever at home and has not been vomiting. He has not noticed her choking on her food. \"    CM met with pt and her  at bedside to discuss care transition. Prior to admission pt had limited mobility. Pt has a hospital bed, RW , wheel chair and a gait belt in the home. Pt has 2 nurses that provide assistance in the home that is paid for privately. Family has indicated the plan is for pt to return home with the same support she had before admission. Anticipate pt will remain inpt through the weekend. CM to continue to follow and assist with care transition.     Care Management Interventions  Mode of Transport at Discharge: BLS  Transition of Care Consult (CM Consult): Discharge Planning  Health Maintenance Reviewed: Yes  Support Systems: Spouse/Significant Other,Caregiver/Home Care Staff,Other Family Member(s)  The Plan for Transition of Care is Related to the Following Treatment Goals : Home with physician follow up and private duty nurses  Discharge Location  Patient Expects to be Discharged to[de-identified] Home with family assistance

## 2022-07-16 NOTE — PROGRESS NOTES
Problem: Pressure Injury - Risk of  Goal: *Prevention of pressure injury  Description: Document Luca Scale and appropriate interventions in the flowsheet. Outcome: Progressing Towards Goal  Note: Pressure Injury Interventions:  Sensory Interventions: Minimize linen layers,Keep linens dry and wrinkle-free,Discuss PT/OT consult with provider    Moisture Interventions: Absorbent underpads,Minimize layers    Activity Interventions: PT/OT evaluation,Pressure redistribution bed/mattress(bed type)    Mobility Interventions: PT/OT evaluation,Pressure redistribution bed/mattress (bed type)    Nutrition Interventions: Document food/fluid/supplement intake                     Problem: Patient Education: Go to Patient Education Activity  Goal: Patient/Family Education  Outcome: Progressing Towards Goal     Problem: Falls - Risk of  Goal: *Absence of Falls  Description: Document Sherrill Fall Risk and appropriate interventions in the flowsheet.   Outcome: Progressing Towards Goal  Note: Fall Risk Interventions:  Mobility Interventions: PT Consult for mobility concerns,Patient to call before getting OOB,Bed/chair exit alarm    Mentation Interventions: Adequate sleep, hydration, pain control,Bed/chair exit alarm         Elimination Interventions: Bed/chair exit alarm,Call light in reach              Problem: Patient Education: Go to Patient Education Activity  Goal: Patient/Family Education  Outcome: Progressing Towards Goal     Problem: Patient Education: Go to Patient Education Activity  Goal: Patient/Family Education  Outcome: Progressing Towards Goal     Problem: Patient Education: Go to Patient Education Activity  Goal: Patient/Family Education  Outcome: Progressing Towards Goal

## 2022-07-17 LAB
ALBUMIN SERPL-MCNC: 2.8 G/DL (ref 3.4–5)
ALBUMIN/GLOB SERPL: 0.9 {RATIO} (ref 0.8–1.7)
ALP SERPL-CCNC: 77 U/L (ref 45–117)
ALT SERPL-CCNC: 11 U/L (ref 13–56)
ANION GAP SERPL CALC-SCNC: 8 MMOL/L (ref 3–18)
AST SERPL-CCNC: 16 U/L (ref 10–38)
ATRIAL RATE: 92 BPM
BACTERIA SPEC CULT: ABNORMAL
BILIRUB SERPL-MCNC: 0.5 MG/DL (ref 0.2–1)
BUN SERPL-MCNC: 10 MG/DL (ref 7–18)
BUN/CREAT SERPL: 12 (ref 12–20)
CALCIUM SERPL-MCNC: 8.3 MG/DL (ref 8.5–10.1)
CALCULATED P AXIS, ECG09: 51 DEGREES
CALCULATED R AXIS, ECG10: 9 DEGREES
CALCULATED T AXIS, ECG11: 48 DEGREES
CC UR VC: ABNORMAL
CHLORIDE SERPL-SCNC: 108 MMOL/L (ref 100–111)
CO2 SERPL-SCNC: 24 MMOL/L (ref 21–32)
CREAT SERPL-MCNC: 0.84 MG/DL (ref 0.6–1.3)
DIAGNOSIS, 93000: NORMAL
ERYTHROCYTE [DISTWIDTH] IN BLOOD BY AUTOMATED COUNT: 13.1 % (ref 11.6–14.5)
GLOBULIN SER CALC-MCNC: 3.1 G/DL (ref 2–4)
GLUCOSE SERPL-MCNC: 76 MG/DL (ref 74–99)
HCT VFR BLD AUTO: 28.3 % (ref 35–45)
HGB BLD-MCNC: 9 G/DL (ref 12–16)
MCH RBC QN AUTO: 28.5 PG (ref 24–34)
MCHC RBC AUTO-ENTMCNC: 31.8 G/DL (ref 31–37)
MCV RBC AUTO: 89.6 FL (ref 78–100)
NRBC # BLD: 0 K/UL (ref 0–0.01)
NRBC BLD-RTO: 0 PER 100 WBC
P-R INTERVAL, ECG05: 190 MS
PLATELET # BLD AUTO: 215 K/UL (ref 135–420)
PMV BLD AUTO: 11.6 FL (ref 9.2–11.8)
POTASSIUM SERPL-SCNC: 3.6 MMOL/L (ref 3.5–5.5)
PROT SERPL-MCNC: 5.9 G/DL (ref 6.4–8.2)
Q-T INTERVAL, ECG07: 340 MS
QRS DURATION, ECG06: 70 MS
QTC CALCULATION (BEZET), ECG08: 420 MS
RBC # BLD AUTO: 3.16 M/UL (ref 4.2–5.3)
SERVICE CMNT-IMP: ABNORMAL
SODIUM SERPL-SCNC: 140 MMOL/L (ref 136–145)
VENTRICULAR RATE, ECG03: 92 BPM
WBC # BLD AUTO: 7.3 K/UL (ref 4.6–13.2)

## 2022-07-17 PROCEDURE — 74011250637 HC RX REV CODE- 250/637: Performed by: FAMILY MEDICINE

## 2022-07-17 PROCEDURE — 65270000029 HC RM PRIVATE

## 2022-07-17 PROCEDURE — 74011250636 HC RX REV CODE- 250/636: Performed by: FAMILY MEDICINE

## 2022-07-17 PROCEDURE — 74011000250 HC RX REV CODE- 250: Performed by: FAMILY MEDICINE

## 2022-07-17 PROCEDURE — 74011000258 HC RX REV CODE- 258: Performed by: FAMILY MEDICINE

## 2022-07-17 PROCEDURE — 36415 COLL VENOUS BLD VENIPUNCTURE: CPT

## 2022-07-17 PROCEDURE — 74011250637 HC RX REV CODE- 250/637: Performed by: INTERNAL MEDICINE

## 2022-07-17 PROCEDURE — 80053 COMPREHEN METABOLIC PANEL: CPT

## 2022-07-17 PROCEDURE — 85027 COMPLETE CBC AUTOMATED: CPT

## 2022-07-17 RX ADMIN — CYANOCOBALAMIN TAB 1000 MCG 1000 MCG: 1000 TAB at 11:03

## 2022-07-17 RX ADMIN — PIPERACILLIN AND TAZOBACTAM 2.25 G: 2; .25 INJECTION, POWDER, LYOPHILIZED, FOR SOLUTION INTRAVENOUS at 04:22

## 2022-07-17 RX ADMIN — AMLODIPINE BESYLATE 10 MG: 5 TABLET ORAL at 11:03

## 2022-07-17 RX ADMIN — Medication 2000 UNITS: at 11:02

## 2022-07-17 RX ADMIN — SODIUM CHLORIDE 100 ML/HR: 9 INJECTION, SOLUTION INTRAVENOUS at 00:57

## 2022-07-17 RX ADMIN — ACETAMINOPHEN 650 MG: 325 TABLET ORAL at 18:56

## 2022-07-17 RX ADMIN — SODIUM CHLORIDE, PRESERVATIVE FREE 10 ML: 5 INJECTION INTRAVENOUS at 05:59

## 2022-07-17 RX ADMIN — PIPERACILLIN AND TAZOBACTAM 2.25 G: 2; .25 INJECTION, POWDER, LYOPHILIZED, FOR SOLUTION INTRAVENOUS at 11:03

## 2022-07-17 RX ADMIN — ACETAMINOPHEN 650 MG: 325 TABLET ORAL at 22:00

## 2022-07-17 RX ADMIN — ROSUVASTATIN CALCIUM 10 MG: 10 TABLET, FILM COATED ORAL at 22:00

## 2022-07-17 RX ADMIN — PAROXETINE HYDROCHLORIDE 10 MG: 10 TABLET, FILM COATED ORAL at 11:03

## 2022-07-17 RX ADMIN — PIPERACILLIN AND TAZOBACTAM 2.25 G: 2; .25 INJECTION, POWDER, LYOPHILIZED, FOR SOLUTION INTRAVENOUS at 18:57

## 2022-07-17 RX ADMIN — SODIUM CHLORIDE, PRESERVATIVE FREE 10 ML: 5 INJECTION INTRAVENOUS at 22:14

## 2022-07-17 RX ADMIN — FAMOTIDINE 20 MG: 20 TABLET, FILM COATED ORAL at 11:02

## 2022-07-17 RX ADMIN — PIPERACILLIN AND TAZOBACTAM 2.25 G: 2; .25 INJECTION, POWDER, LYOPHILIZED, FOR SOLUTION INTRAVENOUS at 22:45

## 2022-07-17 RX ADMIN — ENOXAPARIN SODIUM 30 MG: 100 INJECTION SUBCUTANEOUS at 11:03

## 2022-07-17 RX ADMIN — SODIUM CHLORIDE 100 ML/HR: 9 INJECTION, SOLUTION INTRAVENOUS at 22:57

## 2022-07-17 RX ADMIN — ACETAMINOPHEN 650 MG: 325 TABLET ORAL at 11:03

## 2022-07-17 RX ADMIN — FAMOTIDINE 20 MG: 20 TABLET, FILM COATED ORAL at 22:00

## 2022-07-17 NOTE — PROGRESS NOTES
Problem: Pressure Injury - Risk of  Goal: *Prevention of pressure injury  Description: Document Luca Scale and appropriate interventions in the flowsheet. Outcome: Progressing Towards Goal  Note: Pressure Injury Interventions:  Sensory Interventions: Minimize linen layers,Check visual cues for pain,Pressure redistribution bed/mattress (bed type)    Moisture Interventions: Absorbent underpads,Check for incontinence Q2 hours and as needed    Activity Interventions: Pressure redistribution bed/mattress(bed type)    Mobility Interventions: Pressure redistribution bed/mattress (bed type)    Nutrition Interventions: Document food/fluid/supplement intake                     Problem: Patient Education: Go to Patient Education Activity  Goal: Patient/Family Education  Outcome: Progressing Towards Goal     Problem: Falls - Risk of  Goal: *Absence of Falls  Description: Document Sherrill Fall Risk and appropriate interventions in the flowsheet.   Outcome: Progressing Towards Goal  Note: Fall Risk Interventions:  Mobility Interventions: Bed/chair exit alarm,Patient to call before getting OOB,Utilize walker, cane, or other assistive device    Mentation Interventions: Adequate sleep, hydration, pain control,Door open when patient unattended,More frequent rounding,Reorient patient         Elimination Interventions: Call light in reach              Problem: Patient Education: Go to Patient Education Activity  Goal: Patient/Family Education  Outcome: Progressing Towards Goal     Problem: Patient Education: Go to Patient Education Activity  Goal: Patient/Family Education  Outcome: Progressing Towards Goal     Problem: Patient Education: Go to Patient Education Activity  Goal: Patient/Family Education  Outcome: Progressing Towards Goal

## 2022-07-17 NOTE — PROGRESS NOTES
26 - Bedside shift report received from [place], [address] [place] [address]. Assumed care of patient. Patient noted resting in bed with eyes closed at this time. No Respirations even and unlabored. No s/s of any respiratory distress. No s/s of any pain/discomfort. Call light in reach. 1103 - Assessment completed as per flowsheets. Patient alert and oriented to self. Purewick in place. IV intact to left FA and infusing without difficulty. Patient resting in bed with call light in reach. 36 -  and caretaker at bedside.

## 2022-07-17 NOTE — PROGRESS NOTES
Hospitalist Progress Note    Patient: Chapito Quintero MRN: [id number]  CSN: [id number]    YOB: 1927  Age: 80 y.o. Sex: female    DOA: 7/14/2022 LOS:  LOS: 3 days            Assessment/Plan     1. UTI  2. Possible diverticulitis  3. Dehydration- resolved  4. Hypokalemia- resolved  5. Dementia  6. Mitral stenosis, mild to moderate, appears asymptomatic       Plan:  - continue zosyn  - advance diet  - OOB  - cont antihypertensives      Patient Active Problem List   Diagnosis Code    UTI (urinary tract infection) N39.0    HTN (hypertension) I10    Dementia ([facility] [address] F03.90    History of hemorrhagic stroke with residual hemiplegia (HCC) I69.359    Diverticulosis of colon K57.30    Diverticulitis large intestine K57.32    Weakness generalized R53.1    Heart murmur R01.1               Subjective:    cc: \" im huungry\"  No acute events overnight  Tolerating abx  confused      REVIEW OF SYSTEMS:  General: No fevers or chills. Cardiovascular: No chest pain or pressure. No palpitations. Pulmonary: No shortness of breath. Gastrointestinal: No nausea, vomiting. Objective:        Vital signs/Intake and Output:  Visit Vitals  BP (!) 138/92   Pulse 88   Temp 98.4 °F (36.9 °C)   Resp 16   Ht 5' 3\" (1.6 m)   Wt 49 kg (108 lb)   SpO2 98%   BMI 19.13 kg/m²     Current Shift:  No intake/output data recorded. Last three shifts:  07/15 1901 - 07/17 0700  In: 220 [P.O.:120; I.V.:100]  Out: 1600 [Urine:1600]    Body mass index is 19.13 kg/m².     Physical Exam:  GEN: pleasantly confused  EYES: conjunctiva normal, lids with out lesions  HEENT: MMM, No thyromegaly, no lymphadenopathy  HEART: RRR +S1 +S2, no JVD, pulses 2+ distally  LUNGS: CTA B/L no wheezes, rales or rhonchi  ABDOMEN: + BS, soft NT/ND no organomegaly,  No rebound  EXTREMITIES: No edema cyanosis, cap refill normal   SKIN: no rashes or skin breakdown, no nodules, normal turgor  Current Facility-Administered Medications   Medication Dose Route Frequency    acetaminophen (TYLENOL) tablet 650 mg  650 mg Oral TID    famotidine (PEPCID) tablet 20 mg  20 mg Oral BID    piperacillin-tazobactam (ZOSYN) 2.25 g in 0.9% sodium chloride (MBP/ADV) 50 mL MBP  2.25 g IntraVENous Q6H    amLODIPine (NORVASC) tablet 10 mg  10 mg Oral DAILY    PARoxetine (PAXIL) tablet 10 mg  10 mg Oral DAILY    rosuvastatin (CRESTOR) tablet 10 mg  10 mg Oral QHS    cholecalciferol (VITAMIN D3) (1000 Units /25 mcg) tablet 2,000 Units  2,000 Units Oral DAILY    cyanocobalamin tablet 1,000 mcg  1,000 mcg Oral DAILY    enoxaparin (LOVENOX) injection 30 mg  30 mg SubCUTAneous DAILY    sodium chloride (NS) flush 5-40 mL  5-40 mL IntraVENous Q8H    sodium chloride (NS) flush 5-40 mL  5-40 mL IntraVENous PRN    acetaminophen (TYLENOL) tablet 650 mg  650 mg Oral Q6H PRN    Or    acetaminophen (TYLENOL) suppository 650 mg  650 mg Rectal Q6H PRN    polyethylene glycol (MIRALAX) packet 17 g  17 g Oral DAILY PRN    ondansetron (ZOFRAN ODT) tablet 4 mg  4 mg Oral Q8H PRN    Or    ondansetron (ZOFRAN) injection 4 mg  4 mg IntraVENous Q6H PRN    0.9% sodium chloride infusion  100 mL/hr IntraVENous CONTINUOUS         All the patient's labs over the past 24 hours were reviewed both during my initial daily workflow process and at the time notated as \"note time\" in [facility]. (It is not time stamped separately in this workflow.)  Select labs are listed below.         Labs: Results:       Chemistry Recent Labs     07/17/22  0120 07/16/22  0525 07/15/22  0030   GLU 76 78 107*    139 138   K 3.6 3.3* 4.0    107 106   CO2 24 27 27   BUN 10 12 19*   CREA 0.84 0.97 1.01   CA 8.3* 8.5 8.3*   AGAP 8 5 5   BUCR 12 12 19   AP 77 80 83   TP 5.9* 6.2* 6.4   ALB 2.8* 2.9* 3.1*   GLOB 3.1 3.3 3.3   AGRAT 0.9 0.9 0.9      CBC w/Diff Recent Labs     07/17/22  0120 07/16/22  0525 07/15/22  0030 07/14/22  1604 07/14/22  1604   WBC 7.3 8.2 9.4   < > 8.3   RBC 3.16* 3.33* 3.38*   < > 3.41*   HGB 9.0* 9.7* 10.0*   < > 9.8*   HCT 28.3* 30.1* 31.2*   < > 31.1*    223 198   < > 222   GRANS  --   --   --   --  78*   LYMPH  --   --   --   --  13*   EOS  --   --   --   --  1    < > = values in this interval not displayed.                       Liver Enzymes Recent Labs     07/17/22  0120   TP 5.9*   ALB 2.8*   AP 77          Procedures/imaging: see electronic medical records for all procedures/Xrays and details which were not copied into this note but were reviewed prior to creation of [address], DO  Internal Medicine/Geriatrics

## 2022-07-17 NOTE — PROGRESS NOTES
Problem: Pressure Injury - Risk of  Goal: *Prevention of pressure injury  Description: Document Luca Scale and appropriate interventions in the flowsheet. Outcome: Progressing Towards Goal  Note: Pressure Injury Interventions:  Sensory Interventions: Minimize linen layers,Maintain/enhance activity level,Keep linens dry and wrinkle-free    Moisture Interventions: Absorbent underpads    Activity Interventions: PT/OT evaluation    Mobility Interventions: Pressure redistribution bed/mattress (bed type)    Nutrition Interventions: Document food/fluid/supplement intake                     Problem: Patient Education: Go to Patient Education Activity  Goal: Patient/Family Education  Outcome: Progressing Towards Goal     Problem: Falls - Risk of  Goal: *Absence of Falls  Description: Document Sherrill Fall Risk and appropriate interventions in the flowsheet. Outcome: Progressing Towards Goal  Note: Fall Risk Interventions:  Mobility Interventions: Bed/chair exit alarm,PT Consult for assist device competence    Mentation Interventions: Adequate sleep, hydration, pain control         Elimination Interventions: Bed/chair exit alarm              Problem: Patient Education: Go to Patient Education Activity  Goal: Patient/Family Education  Outcome: Progressing Towards Goal     Problem: Patient Education: Go to Patient Education Activity  Goal: Patient/Family Education  Outcome: Progressing Towards Goal     Problem: Patient Education: Go to Patient Education Activity  Goal: Patient/Family Education  Outcome: Progressing Towards Goal     Problem: Pressure Injury - Risk of  Goal: *Prevention of pressure injury  Description: Document Luca Scale and appropriate interventions in the flowsheet.   Outcome: Progressing Towards Goal  Note: Pressure Injury Interventions:  Sensory Interventions: Minimize linen layers,Maintain/enhance activity level,Keep linens dry and wrinkle-free    Moisture Interventions: Absorbent underpads    Activity Interventions: PT/OT evaluation    Mobility Interventions: Pressure redistribution bed/mattress (bed type)    Nutrition Interventions: Document food/fluid/supplement intake                     Problem: Patient Education: Go to Patient Education Activity  Goal: Patient/Family Education  Outcome: Progressing Towards Goal     Problem: Falls - Risk of  Goal: *Absence of Falls  Description: Document Sondra Kurtz Fall Risk and appropriate interventions in the flowsheet.   Outcome: Progressing Towards Goal  Note: Fall Risk Interventions:  Mobility Interventions: Bed/chair exit alarm,PT Consult for assist device competence    Mentation Interventions: Adequate sleep, hydration, pain control         Elimination Interventions: Bed/chair exit alarm              Problem: Patient Education: Go to Patient Education Activity  Goal: Patient/Family Education  Outcome: Progressing Towards Goal     Problem: Patient Education: Go to Patient Education Activity  Goal: Patient/Family Education  Outcome: Progressing Towards Goal     Problem: Patient Education: Go to Patient Education Activity  Goal: Patient/Family Education  Outcome: Progressing Towards Goal

## 2022-07-18 VITALS
HEART RATE: 85 BPM | HEIGHT: 63 IN | OXYGEN SATURATION: 98 % | BODY MASS INDEX: 19.14 KG/M2 | DIASTOLIC BLOOD PRESSURE: 70 MMHG | TEMPERATURE: 99.4 F | SYSTOLIC BLOOD PRESSURE: 124 MMHG | RESPIRATION RATE: 16 BRPM | WEIGHT: 108 LBS

## 2022-07-18 PROCEDURE — 74011000250 HC RX REV CODE- 250: Performed by: FAMILY MEDICINE

## 2022-07-18 PROCEDURE — 74011250637 HC RX REV CODE- 250/637: Performed by: INTERNAL MEDICINE

## 2022-07-18 PROCEDURE — 74011250637 HC RX REV CODE- 250/637: Performed by: FAMILY MEDICINE

## 2022-07-18 PROCEDURE — 97116 GAIT TRAINING THERAPY: CPT

## 2022-07-18 PROCEDURE — 74011000258 HC RX REV CODE- 258: Performed by: FAMILY MEDICINE

## 2022-07-18 PROCEDURE — 74011000258 HC RX REV CODE- 258: Performed by: HOSPITALIST

## 2022-07-18 PROCEDURE — 74011250636 HC RX REV CODE- 250/636: Performed by: FAMILY MEDICINE

## 2022-07-18 PROCEDURE — 74011250636 HC RX REV CODE- 250/636: Performed by: HOSPITALIST

## 2022-07-18 PROCEDURE — 97530 THERAPEUTIC ACTIVITIES: CPT

## 2022-07-18 PROCEDURE — 92526 ORAL FUNCTION THERAPY: CPT

## 2022-07-18 RX ORDER — SAME BUTANEDISULFONATE/BETAINE 400-600 MG
250 POWDER IN PACKET (EA) ORAL 2 TIMES DAILY
Qty: 14 CAPSULE | Refills: 0 | Status: SHIPPED | OUTPATIENT
Start: 2022-07-18 | End: 2022-07-25

## 2022-07-18 RX ORDER — FAMOTIDINE 20 MG/1
20 TABLET, FILM COATED ORAL DAILY
Status: DISCONTINUED | OUTPATIENT
Start: 2022-07-19 | End: 2022-07-18 | Stop reason: HOSPADM

## 2022-07-18 RX ORDER — CEFUROXIME AXETIL 500 MG/1
500 TABLET ORAL 2 TIMES DAILY
Qty: 8 TABLET | Refills: 0 | Status: SHIPPED | OUTPATIENT
Start: 2022-07-18

## 2022-07-18 RX ADMIN — SODIUM CHLORIDE, PRESERVATIVE FREE 10 ML: 5 INJECTION INTRAVENOUS at 15:16

## 2022-07-18 RX ADMIN — PIPERACILLIN AND TAZOBACTAM 2.25 G: 2; .25 INJECTION, POWDER, LYOPHILIZED, FOR SOLUTION INTRAVENOUS at 04:14

## 2022-07-18 RX ADMIN — ENOXAPARIN SODIUM 30 MG: 100 INJECTION SUBCUTANEOUS at 09:25

## 2022-07-18 RX ADMIN — Medication 2000 UNITS: at 09:24

## 2022-07-18 RX ADMIN — FAMOTIDINE 20 MG: 20 TABLET, FILM COATED ORAL at 09:24

## 2022-07-18 RX ADMIN — ACETAMINOPHEN 650 MG: 325 TABLET ORAL at 09:23

## 2022-07-18 RX ADMIN — CEFTRIAXONE 1 G: 1 INJECTION, POWDER, FOR SOLUTION INTRAMUSCULAR; INTRAVENOUS at 09:25

## 2022-07-18 RX ADMIN — PAROXETINE HYDROCHLORIDE 10 MG: 10 TABLET, FILM COATED ORAL at 09:24

## 2022-07-18 RX ADMIN — SODIUM CHLORIDE, PRESERVATIVE FREE 10 ML: 5 INJECTION INTRAVENOUS at 06:33

## 2022-07-18 RX ADMIN — AMLODIPINE BESYLATE 10 MG: 5 TABLET ORAL at 09:24

## 2022-07-18 RX ADMIN — CYANOCOBALAMIN TAB 1000 MCG 1000 MCG: 1000 TAB at 09:24

## 2022-07-18 NOTE — ROUTINE PROCESS
Dual AVS reviewed with ADAMARIS Bloom. All medications reviewed individually with patient. Opportunities for questions and concerns provided. Patient verbalized understanding and verified by teachback. IV discontinued, no redness, swelling or pain noted. Patient discharged via (mode of transport ie. Car, ambulance or air transport) car. Patient's arm band appropriately discarded.

## 2022-07-18 NOTE — PROGRESS NOTES
Problem: Pressure Injury - Risk of  Goal: *Prevention of pressure injury  Description: Document Luca Scale and appropriate interventions in the flowsheet. Outcome: Progressing Towards Goal  Note: Pressure Injury Interventions:  Sensory Interventions: Minimize linen layers    Moisture Interventions: Absorbent underpads    Activity Interventions: Pressure redistribution bed/mattress(bed type)    Mobility Interventions: Pressure redistribution bed/mattress (bed type)    Nutrition Interventions: Document food/fluid/supplement intake                     Problem: Patient Education: Go to Patient Education Activity  Goal: Patient/Family Education  Outcome: Progressing Towards Goal     Problem: Falls - Risk of  Goal: *Absence of Falls  Description: Document Sherrill Fall Risk and appropriate interventions in the flowsheet.   Outcome: Progressing Towards Goal  Note: Fall Risk Interventions:  Mobility Interventions: Bed/chair exit alarm    Mentation Interventions: Adequate sleep, hydration, pain control         Elimination Interventions: Call light in reach

## 2022-07-18 NOTE — ROUTINE PROCESS
1113  Bedside and Verbal shift change report given to Jean Purcell RN (oncoming nurse) by Celestina Winkler RN (offgoing nurse). Report included the following information SBAR, Kardex and MAR.

## 2022-07-18 NOTE — PROGRESS NOTES
0815   Pt is awake but confused. Lungs are clear. Abdomen soft with active BS. Pt eating breakfast. Pt has Purewick external cath draining clear yellow urine. 0950  Pt was incontinent of soft and pasty brownish stools of large amt. Pt was kept clean and dry. Mepilex dressing applied to reddened areas on sacru. New purewick was applied. 1100  Pt resting in bed. IV F d/brennen. INT intact.

## 2022-07-18 NOTE — PROGRESS NOTES
Noted plan discharge today. CM met with pt and her  at bedside. Pt/family are aware and in agreement with plan discharged today. Pt has been screened by therapy and is able to stand and pivot. Given this family would prefer to transport pt home in his private vehicle. Pt will be accompanied by her private duty nurse and her . No other care transition needs have been identified.     Care Management Interventions  Mode of Transport at Discharge: BLS  Transition of Care Consult (CM Consult): Discharge Planning  Health Maintenance Reviewed: Yes  Support Systems: Spouse/Significant Other,Caregiver/Home Care Staff,Other Family Member(s)  The Plan for Transition of Care is Related to the Following Treatment Goals : Home with physician follow up and private duty nurses  Discharge Location  Patient Expects to be Discharged to[de-identified] Home with family assistance

## 2022-07-18 NOTE — DISCHARGE INSTRUCTIONS
Urinary Tract Infection (UTI) in Women: Care Instructions  Overview     A urinary tract infection, or UTI, is a general term for an infection anywhere between the kidneys and the urethra (where urine comes out). Most UTIs are bladder infections. They often cause pain or burning when you urinate. UTIs are caused by bacteria and can be cured with antibiotics. Be sure to complete your treatment so that the infection does not get worse. Follow-up care is a key part of your treatment and safety. Be sure to make and go to all appointments, and call your doctor if you are having problems. It's also a good idea to know your test results and keep a list of the medicines you take. How can you care for yourself at home? · Take your antibiotics as directed. Do not stop taking them just because you feel better. You need to take the full course of antibiotics. · Drink extra water and other fluids for the next day or two. This will help make the urine less concentrated and help wash out the bacteria that are causing the infection. (If you have kidney, heart, or liver disease and have to limit fluids, talk with your doctor before you increase the amount of fluids you drink.)  · Avoid drinks that are carbonated or have caffeine. They can irritate the bladder. · Urinate often. Try to empty your bladder each time. · To relieve pain, take a hot bath or lay a heating pad set on low over your lower belly or genital area. Never go to sleep with a heating pad in place. To prevent UTIs  · Drink plenty of water each day. This helps you urinate often, which clears bacteria from your system. (If you have kidney, heart, or liver disease and have to limit fluids, talk with your doctor before you increase the amount of fluids you drink.)  · Urinate when you need to. · If you are sexually active, urinate right after you have sex. · Change sanitary pads often.   · Avoid douches, bubble baths, feminine hygiene sprays, and other feminine hygiene products that have deodorants. · After going to the bathroom, wipe from front to back. When should you call for help? Call your doctor now or seek immediate medical care if:    · Symptoms such as fever, chills, nausea, or vomiting get worse or appear for the first time.     · You have new pain in your back just below your rib cage. This is called flank pain.     · There is new blood or pus in your urine.     · You have any problems with your antibiotic medicine. Watch closely for changes in your health, and be sure to contact your doctor if:    · You are not getting better after taking an antibiotic for 2 days.     · Your symptoms go away but then come back. Where can you learn more? Go to http://www.gray.com/  Enter G202 in the search box to learn more about \"Urinary Tract Infection (UTI) in Women: Care Instructions. \"  Current as of: October 18, 2021               Content Version: 13.2  © 2006-2022 [employer]. Care instructions adapted under license by [employer] (which disclaims liability or warranty for this information). If you have questions about a medical condition or this instruction, always ask your healthcare professional. [place] [address] any warranty or liability for your use of this information. Patient Education        Learning About Diverticulosis and Diverticulitis  What are diverticulosis and diverticulitis? In diverticulosis and diverticulitis, pouches called diverticula form in the wall of the large intestine, or colon. · In diverticulosis, the pouches do not cause any pain or other symptoms. · In diverticulitis, the pouches get inflamed or infected and cause symptoms. Doctors aren't sure what causes these pouches in the colon. But they think that a low-fiber diet may play a role. Without fiber to add bulk to the stool, the colon has to work harder than normal to push the stool forward.  The pressure from this may cause pouches to form in weak spots along the colon. Some people with diverticulosis get diverticulitis. But experts don't know why this happens. What are the symptoms? · In diverticulosis, most people don't have symptoms. But pouches sometimes bleed. · In diverticulitis, symptoms may last from a few hours to a week or more. They include:  ? Belly pain. This is usually in the lower left side. It is sometimes worse when you move. This is the most common symptom. ? Fever and chills. ? Bloating and gas. ? Diarrhea or constipation. ? Nausea and sometimes vomiting.  ? Not feeling like eating. How can you prevent diverticulitis? You may be able to lower your chance of getting diverticulitis. You can do this by taking steps to prevent constipation. · Eat fruits, vegetables, beans, and whole grains every day. These foods are high in fiber. · Drink plenty of fluids. If you have kidney, heart, or liver disease and have to limit fluids, talk with your doctor before you increase the amount of fluids you drink. · Get at least 30 minutes of exercise on most days of the week. Walking is a good choice. You also may want to do other activities, such as running, swimming, cycling, or playing tennis or team sports. · Take a fiber supplement, such as Citrucel or Metamucil, every day if needed. Read and follow all instructions on the label. · Schedule time each day for a bowel movement. Having a daily routine may help. Take your time and do not strain when having a bowel movement. Some people avoid nuts, seeds, berries, and popcorn. They believe that these foods might get trapped in the diverticula and cause pain. But there is no proof that these foods cause diverticulitis or make it worse. How are these problems treated? · The best way to treat diverticulosis is to avoid constipation. · Treatment for diverticulitis includes antibiotics. It often includes a change in your diet.  You may need only liquids at first. Your doctor may suggest pain medicines for pain or belly cramps. In some cases, surgery may be needed. Follow-up care is a key part of your treatment and safety. Be sure to make and go to all appointments, and call your doctor if you are having problems. It's also a good idea to know your test results and keep a list of the medicines you take. Where can you learn more? Go to http://natasha-bruce.info/  Enter Y334 in the search box to learn more about \"Learning About Diverticulosis and Diverticulitis. \"  Current as of: September 8, 2021               Content Version: 13.2  © 2006-2022 [employer]. Care instructions adapted under license by [employer] (which disclaims liability or warranty for this information). If you have questions about a medical condition or this instruction, always ask your healthcare professional. [place] [address] any warranty or liability for your use of this information. Patient Education        Diverticulitis: Care Instructions  Overview     Diverticulitis occurs when pouches form in the wall of the colon and become inflamed or infected. It can be very painful. Doctors aren't sure what causes diverticulitis. There is no proof that foods such as nuts, seeds, or berries cause it or make it worse. A low-fiber diet may cause the colon to work harder to push stool forward. Pouches may form because of this extra work. It may be hard to think about healthy eating while you're in pain. But as you recover, you might think about how you can use healthy eating for overall better health. Healthy eating may help you avoid future attacks. Follow-up care is a key part of your treatment and safety. Be sure to make and go to all appointments, and call your doctor if you are having problems. It's also a good idea to know your test results and keep a list of the medicines you take.   How can you care for yourself at home?  · Drink plenty of fluids. If you have kidney, heart, or liver disease and have to limit fluids, talk with your doctor before you increase the amount of fluids you drink. · Stay with liquids or a bland diet (plain rice, bananas, dry toast or crackers, applesauce) until you are feeling better. Then you can return to regular foods and slowly increase the amount of fiber in your diet. · Use a heating pad set on low on your belly to relieve mild cramps and pain. · Get extra rest until you are feeling better. · Be safe with medicines. Read and follow all instructions on the label. ? If the doctor gave you a prescription medicine for pain, take it as prescribed. ? If you are not taking a prescription pain medicine, ask your doctor if you can take an over-the-counter medicine. · If your doctor prescribed antibiotics, take them as directed. Do not stop taking them just because you feel better. You need to take the full course of antibiotics. · Do not use laxatives or enemas unless your doctor tells you to use them. When should you call for help? Call your doctor now or seek immediate medical care if:    · You have a fever.     · You are vomiting.     · You have new or worse belly pain.     · You cannot pass stools or gas. Watch closely for changes in your health, and be sure to contact your doctor if you have any problems. Where can you learn more? Go to http://www.gray.com/  Enter H901 in the search box to learn more about \"Diverticulitis: Care Instructions. \"  Current as of: September 8, 2021               Content Version: 13.2  © 2006-2022 [employer]. Care instructions adapted under license by [employer] (which disclaims liability or warranty for this information).  If you have questions about a medical condition or this instruction, always ask your healthcare professional. [address] any warranty or liability for your use of this information.

## 2022-07-18 NOTE — ROUTINE PROCESS
Bedside shift change report given to Diane Westbrook RN (oncoming nurse) by TIARA Teran RN (offgoing nurse). Report included the following information SBAR, Kardex, ED Summary, Intake/Output, MAR and Recent Results.

## 2022-07-18 NOTE — PROGRESS NOTES
Pharmacy Renal Dosing Services:     Famotidine was automatically dose-adjusted per [facility] P&T Committee Protocol, with respect to renal function. Consult provided for this   80 y.o. , female , for the indication of Symptomatic GERD. Dose adjusted to:   famotidine 20 mg po daily       Pt Weight:   Wt Readings from Last 1 Encounters:   07/15/22 49 kg (108 lb)     Previous Regimen    Famotidine 20 mg po twice daily   Serum Creatinine Lab Results   Component Value Date/Time    Creatinine 0.84 07/17/2022 01:20 AM       Creatinine Clearance Estimated Creatinine Clearance: 31 mL/min (based on SCr of 0.84 mg/dL). BUN Lab Results   Component Value Date/Time    BUN 10 07/17/2022 01:20 AM         Pharmacy to continue to monitor patient daily. Will make dosage adjustments based upon changing renal function.   Signed Mart [place], [address] information:  [phone]

## 2022-07-18 NOTE — PROGRESS NOTES
Speech Therapy Treatment Attempt     Chart reviewed. Attempted Speech Therapy Treatment, however, patient unable to be seen due to:  []  Nausea/vomiting  []  Eating  []  Pain  []  Patient too lethargic  []  Off Unit for testing/procedure  []  Dialysis treatment in progress  []  Telemetry Results  [x]  Other: 9:10 a.m.- patient did not want to eat (\"I'm full\") and requested ST return later. Will f/u later as patient's schedule allows.   Jonatan Rajan, SLP

## 2022-07-18 NOTE — PROGRESS NOTES
Problem: Mobility Impaired (Adult and Pediatric)  Goal: *Acute Goals and Plan of Care (Insert Text)  Description: Physical Therapy Goals   Physical Therapy Goals   Initiated 7/15/2022 and to be accomplished within 7 day(s) Updated 7/18/2022  1. Patient will move from supine <> sit with SBA in prep for out of bed activity and change of position. 2.  Patient will perform sit<> stand with SBA with RW in prep for transfers/ambulation. 3.  Patient will ambulate 80 feet with S/LRAD for improved functional mobility at discharge. 4.  Patient will demonstrate step nego x 2 with BUE support/min/CGA for home re-entry. Outcome: Progressing Towards Goal  physical Therapy TREATMENT    Patient: Kimberly Lopez (38 y.o. female)  Date: 7/18/2022  Diagnosis: UTI (urinary tract infection) [N39.0]  Diverticulitis [K57.92] Weakness generalized  Precautions: Fall   Chart, physical therapy assessment, plan of care and goals were reviewed. ASSESSMENT:  Pt found supine in bed and personal home caregiver present. Pt w/o c/o. Loel Curet removed and pt supine to sit EOB with supervision. STS from bedside with CGA. Able to participate with GT/RW ~45ft with CGA/vc/tc. Gt initially appears antalgic bilateral feet (caregiver reports pt with painful knees PTA and is often in bed at home with minimal activity except bathroom visits). Antalgic appearance decr'd with time amb'g. Verbal and tactile cues required intermittently t/o GT for instruction, and redirection. Pt returned to room, initially sat in chair for rest, then began to perseverate on return to bed. Encouraged pt to remain up in chair for brief rest, then step nego. No pain reported. Pt able to nego box step x 2 with RW/min/CGA/tc/vc. Redirection again required as pt attempting to step one foot onto box and toward the bed with other foot (h/o dementia). Completed task and chair brought to pt.   Stand to sit with CG/min assist.  SLP notified PT session complete, pt up in chair, and lunch meal at [facility] (pt non-select). Note pt spouse arrived at end of session. Questioned whether pt was able to walk with \"gait belt and a walker\". Confirmed same to spouse and spouse made aware of pt ambulation performance during session. Advised spouse to use w/c for pt transport from car to steps upon arrival home for pt energy conversation for step nego. Spouse expresses understanding and states chair lift used on home entry (~12ft from door) to reach lower level where pt lives. Encouraged use of w/c from door to chair lift if needed for pt energy conservation, depending on pt level of fatigue. Spouse expressed understanding. Care mgr notified pt may travel home with spouse. Will have caregiver assist as well at discharge. Progression toward goals:  [x]      Improving appropriately and progressing toward goals  []      Improving slowly and progressing toward goals  []      Not making progress toward goals and plan of care will be adjusted     PLAN:  Patient continues to benefit from skilled intervention to address the above impairments. Continue treatment per established plan of care. Discharge Recommendations:  Home Physical Therapy  Further Equipment Recommendations for Discharge:  N/A     SUBJECTIVE:   Patient stated Evelyne Profit.     OBJECTIVE DATA SUMMARY:   Critical Behavior:  Neurologic State: Alert,Confused  Orientation Level: Oriented to person  Cognition: Decreased command following,Impaired decision making  Safety/Judgement: Decreased insight into deficits,Fall prevention  Functional Mobility Training:  Bed Mobility:  Supine to Sit: Supervision  Scooting: Supervision  Transfers:  Sit to Stand: Contact guard assistance;Minimum assistance (CGA from bedside; min A from chair)  Stand to Sit: Contact guard assistance;Minimum assistance  Balance:  Sitting: Intact  Standing: With support; Intact  Standing - Static: Fair (fair+)  Standing - Dynamic : Fair (Fair+)  Ambulation/Gait Training:  Distance (ft): 45 Feet (ft)  Assistive Device: Gait belt;Walker, rolling  Ambulation - Level of Assistance: Contact guard assistance  Gait Abnormalities: Decreased step clearance; Antalgic (antalgia decr'd with time)  Base of Support: Narrowed  Speed/Tory: Slow  Step Length: Right shortened;Left shortened  Interventions: Safety awareness training; Tactile cues; Verbal cues; Visual/Demos  Stairs:  Number of Stairs Trained: 2 (box step)  Stairs - Level of Assistance: Contact guard assistance;Minimum assistance  Rail Use: Both (box step with RW)  Pain:  Pain Scale 1: Numeric (0 - 10)  Pain Intensity 1: 0  Pain Location 1: Foot;Knee  Pain Orientation 1: Right;Left  Pain Description 1:  (appears sore when walking)  Activity Tolerance:   Fair   Please refer to the flowsheet for vital signs taken during this treatment.   After treatment:   [x] Patient left in no apparent distress sitting up in chair  [] Patient left in no apparent distress in bed  [x] Call bell left within reach  [x] Nursing notified  [x] Caregiver present-spouse, home caregiver (but leaving), and SLP Shahnaz Ng  [] Bed alarm activated      Ceci Guevara, PT   Time Calculation: 43 mins

## 2022-07-18 NOTE — PROGRESS NOTES
Problem: Dysphagia (Adult)  Description: Patient will:  1. Tolerate PO trials with 0 s/s overt distress in 4/5 trials  2. Caregiver/patient will utilize compensatory swallow strategies/maneuvers (decrease bite/sip, size/rate, alt. liq/sol) with mod cues in 4/5 trials  3. Pt will utilize comp strategies to improve respiration to swallow coordination to reduce aspiration risk and improve activity tolerance for sustained nutrition/ hydration given min-mod cues. Rec: Soft and bite size, thin liquid  Patient may need support with meal setup/feeding (utilizes special utensils at home)  Aspiration precautions  HOB >45 during po intake, remain >30 for 30-45 minutes after po   Small bites/sips; alternate liquid/solid with slow feeding rate   Oral care TID  Meds crushed in puree  Outcome: Progressing Towards Goal    SPEECH LANGUAGE PATHOLOGY DYSPHAGIA TREATMENT    Patient: Hoda Horn (50 y.o. female)  Date: 7/18/2022  Diagnosis: UTI (urinary tract infection) [N39.0]  Diverticulitis [K57.92] Weakness generalized      Precautions: Aspiration, Fall  PLOF: Regular (veg heavy), thin per spouse report    ASSESSMENT:  Patient seen by ST for dysphagia follow-up. Patient seated upright in chair. Alert/oriented to self. Soft/bite-size lunch tray in room. Spouse and caregiver present. Both spouse/caregiver denies difficulties with swallowing prior to admission; spouse reports that the patient primarily feeds herself and uses special utensils (wide ) at home. The patient requested to return to bed but was agreeable to eating with minimal encouragement. Patient did not independently feed self this day. The patient refused lunch tray of chicken, vegetables and potatoes after x1 bite. With assistance, patient took x 6 bites chopped peaches, x1 bite mouse, and x 5 sips thin liquid from open cup. Patient demo prolonged mastication.  Appearance of WFL oral transit, swallow initiation, laryngeal elevation, and oral clearance. No overt s/sx of aspiration visualized. Assisted patient (with use of walker) back to bed. Repositioned with HOB at 30 degrees. Continue POC. ST to follow. Progression toward goals:  [x]         Improving appropriately and progressing toward goals  []         Improving slowly and progressing toward goals  []         Not making progress toward goals and plan of care will be adjusted     PLAN:  Recommendations and Planned Interventions:  See above  Patient continues to benefit from skilled intervention to address the above impairments. Continue treatment per established plan of care. Discharge Recommendations: To Be Determined     SUBJECTIVE:   Patient stated I want to lay in bed. OBJECTIVE:   Cognitive and Communication Status:  Neurologic State: Alert,Confused  Orientation Level: Oriented to person  Cognition: Decreased command following,Impaired decision making  Perception: Appears intact  Perseveration: No perseveration noted  Safety/Judgement: Decreased insight into deficits,Fall prevention  Dysphagia Treatment:  Oral Assessment:  Oral Assessment  Labial: No impairment  Dentition: Natural  Oral Hygiene: fair  Lingual: No impairment  Velum: No impairment  Mandible: Restricted  Gag Reflex: No impairment  P.O. Trials:   Patient Position:  (upright in chair)   Vocal quality prior to P.O.: No impairment   Consistency Presented: Mechanical soft,Thin liquid   How Presented: SLP-fed/presented,Cup/sip,Spoon,Straw       Bolus Acceptance: No impairment   Bolus Formation/Control: Impaired   Type of Impairment: Mastication   Propulsion: No impairment   Oral Residue: Less than 10% of bolus,Lingual   Initiation of Swallow: No impairment   Laryngeal Elevation: Functional   Aspiration Signs/Symptoms: None   Pharyngeal Phase Characteristics: Easily fatigued ,Poor endurance   Effective Modifications: Alternate liquids/solids,Small sips and bites   Cues for Modifications:  Moderate         Oral Phase Severity: Mild   Pharyngeal Phase Severity : Other (comment) (no overt s/sx)       PAIN:  Pain level pre-treatment: 0/10   Pain level post-treatment: 0/10     After treatment:   []              Patient left in no apparent distress sitting up in chair  [x]              Patient left in no apparent distress in bed  []              Call bell left within reach  [x]              Nursing notified  [x]              Family present  []              Caregiver present  []              Bed alarm activated      COMMUNICATION/EDUCATION:   [x] Aspiration precautions; swallow safety; compensatory techniques  []        Patient unable to participate in education; education ongoing with staff  []  Posted safety precautions in patient's room.   [] Oral-motor/laryngeal strengthening exercises      DMITRY Briseno  Time Calculation: 20 mins

## 2022-07-18 NOTE — WOUND CARE
Wound Care Note:    Chart audited for low alex score, patient with high risk for skin breakdown, no documented wounds at time of audit.      Skin Care & Pressure Relief Recommendations  Minimize layers of linen  Pads under patient to optimize support surface  Turn/reposition approximately every 2 hours  Pillow wedges  Manage incontinence   Promote continence; Skin Protective lotion/cream to buttocks and sacrum daily and as needed with incontinence care  Offload heels pillows    Consult wound care if any wounds/ skin care needs noted during admission

## 2022-07-19 NOTE — PROGRESS NOTES
1100 - Bedside shift report received from [facility]. Assumed care of patient. Patient alert and pleasantly confused. Oriented to self. No s/s of any respiratory distress. Purewick in place. Denies any pain/discomfort. Call light in reach.      1345 - Patient noted ambulating in hallway with PT.

## 2022-08-17 ENCOUNTER — NEW PATIENT (OUTPATIENT)
Dept: URBAN - METROPOLITAN AREA CLINIC 64 | Facility: CLINIC | Age: 87
End: 2022-08-17

## 2022-08-17 DIAGNOSIS — H25.9: ICD-10-CM

## 2022-08-17 DIAGNOSIS — Z98.890: ICD-10-CM

## 2022-08-17 DIAGNOSIS — H35.3231: ICD-10-CM

## 2022-08-17 DIAGNOSIS — H43.813: ICD-10-CM

## 2022-08-17 PROCEDURE — 67028 INJECTION EYE DRUG: CPT | Mod: 50

## 2022-08-17 PROCEDURE — 92134 CPTRZ OPH DX IMG PST SGM RTA: CPT

## 2022-08-17 PROCEDURE — 99204 OFFICE O/P NEW MOD 45 MIN: CPT | Mod: 25

## 2022-08-17 ASSESSMENT — VISUAL ACUITY
OD_CC: 20/100
OS_CC: 20/60

## 2022-08-17 ASSESSMENT — TONOMETRY
OD_IOP_MMHG: 17
OS_IOP_MMHG: 15

## 2022-09-26 ENCOUNTER — FOLLOW UP (OUTPATIENT)
Dept: URBAN - METROPOLITAN AREA CLINIC 64 | Facility: CLINIC | Age: 87
End: 2022-09-26

## 2022-09-26 DIAGNOSIS — Z98.890: ICD-10-CM

## 2022-09-26 DIAGNOSIS — H25.9: ICD-10-CM

## 2022-09-26 DIAGNOSIS — H35.3231: ICD-10-CM

## 2022-09-26 DIAGNOSIS — H43.813: ICD-10-CM

## 2022-09-26 PROCEDURE — 67028 INJECTION EYE DRUG: CPT | Mod: 50

## 2022-09-26 PROCEDURE — PFS EYLEA PFS

## 2022-09-26 PROCEDURE — 92202 OPSCPY EXTND ON/MAC DRAW: CPT | Mod: 59

## 2022-09-26 PROCEDURE — 92134 CPTRZ OPH DX IMG PST SGM RTA: CPT

## 2022-09-26 PROCEDURE — 92014 COMPRE OPH EXAM EST PT 1/>: CPT

## 2022-09-26 ASSESSMENT — TONOMETRY
OS_IOP_MMHG: 14
OD_IOP_MMHG: 14

## 2022-09-26 ASSESSMENT — VISUAL ACUITY
OD_CC: 20/100-1
OS_CC: 20/80-1

## 2022-11-21 ENCOUNTER — FOLLOW UP (OUTPATIENT)
Dept: URBAN - METROPOLITAN AREA CLINIC 64 | Facility: CLINIC | Age: 87
End: 2022-11-21

## 2022-11-21 DIAGNOSIS — H25.9: ICD-10-CM

## 2022-11-21 DIAGNOSIS — H35.3231: ICD-10-CM

## 2022-11-21 DIAGNOSIS — Z98.890: ICD-10-CM

## 2022-11-21 DIAGNOSIS — H43.813: ICD-10-CM

## 2022-11-21 PROCEDURE — 92014 COMPRE OPH EXAM EST PT 1/>: CPT | Mod: 25

## 2022-11-21 PROCEDURE — PFS EYLEA PFS

## 2022-11-21 PROCEDURE — 67028 INJECTION EYE DRUG: CPT | Mod: 50

## 2022-11-21 PROCEDURE — 92134 CPTRZ OPH DX IMG PST SGM RTA: CPT

## 2022-11-21 ASSESSMENT — VISUAL ACUITY
OS_PH: 20/80-1
OS_CC: 20/100+1
OD_PH: 20/70-1
OD_CC: 20/100-1

## 2022-11-21 ASSESSMENT — TONOMETRY
OS_IOP_MMHG: 14
OD_IOP_MMHG: 14

## 2023-01-25 ENCOUNTER — FOLLOW UP (OUTPATIENT)
Dept: URBAN - METROPOLITAN AREA CLINIC 64 | Facility: CLINIC | Age: 88
End: 2023-01-25

## 2023-01-25 DIAGNOSIS — Z98.890: ICD-10-CM

## 2023-01-25 DIAGNOSIS — H35.3231: ICD-10-CM

## 2023-01-25 DIAGNOSIS — H25.9: ICD-10-CM

## 2023-01-25 DIAGNOSIS — H43.813: ICD-10-CM

## 2023-01-25 PROCEDURE — 67028 INJECTION EYE DRUG: CPT

## 2023-01-25 PROCEDURE — 92202 OPSCPY EXTND ON/MAC DRAW: CPT | Mod: 59

## 2023-01-25 PROCEDURE — 92134 CPTRZ OPH DX IMG PST SGM RTA: CPT

## 2023-01-25 PROCEDURE — PFS EYLEA PFS

## 2023-01-25 PROCEDURE — 92014 COMPRE OPH EXAM EST PT 1/>: CPT | Mod: 25

## 2023-01-25 ASSESSMENT — VISUAL ACUITY
OD_CC: 20/200
OS_CC: 20/150

## 2023-01-25 ASSESSMENT — TONOMETRY
OD_IOP_MMHG: 10
OS_IOP_MMHG: 12

## (undated) DEVICE — TOWEL SURG W16XL26IN BLU NONFENESTRATED DLX ST 2 PER PK

## (undated) DEVICE — CENTURION ULTRAVIT ANTERIOR VITRECTOMY PROBE: Brand: ALCON, CENTURION, ULTRAVIT

## (undated) DEVICE — ACRYSOF(R) SINGLE-PIECE ACRYLIC FOLDABLE IOL,UV-ABSORBING POSTERIOR CHAMBER LENS (IOL/PC),13.0MM LENGTH, 6.0MM BICONVEX OPTIC, PLANARHAPTICS.
Type: IMPLANTABLE DEVICE | Site: EYE | Status: NON-FUNCTIONAL
Brand: ACRYSOF®

## (undated) DEVICE — SOLUTION IRRIGATION BAL SALT SOLUTION 500 ML STRL BSS

## (undated) DEVICE — 3M(TM) TEGADERM(TM) TRANSPARENT FILM DRESSING FRAME STYLE 9505W: Brand: 3M™ TEGADERM™

## (undated) DEVICE — CANN VISCOFLOW FRM 27G 22MM --

## (undated) DEVICE — GARMENT,MEDLINE,DVT,INT,CALF,MED, GEN2: Brand: MEDLINE

## (undated) DEVICE — Device

## (undated) DEVICE — STRAP,POSITIONING,KNEE/BODY,FOAM,4X60": Brand: MEDLINE

## (undated) DEVICE — NEEDLE HYPO 18GA L1.5IN PNK POLYPR HUB S STL THN WALL FILL

## (undated) DEVICE — SOLUTION IRRIGATION H2O 0797305] ICU MEDICAL INC]

## (undated) DEVICE — CYTOTOME IRRIG 25GA L16MM ANT CAP BENT

## (undated) DEVICE — SUTURE - NEEDLE TYPE CU-5,BLACK MONOFILAMENT NYLON(BMN), SUTURE SIZE 10-0, SUTURE LENGTH 12,DOUBLE ARMED: Brand: A.C.S® (ALCON CLOSURE SYSTEM)

## (undated) DEVICE — SYSTEM FLD MGMT DIA0.9MM 45DEG ULT BAL VISN ACT INTREPID

## (undated) DEVICE — CLEARCUT® SLIT KNIFE INTREPID MICRO-COAXIAL SYSTEM 2.4 SB: Brand: CLEARCUT®; INTREPID

## (undated) DEVICE — GLOVE ORANGE PI 7 1/2   MSG9075